# Patient Record
Sex: MALE | Race: WHITE | NOT HISPANIC OR LATINO | Employment: FULL TIME | URBAN - METROPOLITAN AREA
[De-identification: names, ages, dates, MRNs, and addresses within clinical notes are randomized per-mention and may not be internally consistent; named-entity substitution may affect disease eponyms.]

---

## 2018-11-22 ENCOUNTER — APPOINTMENT (EMERGENCY)
Dept: CT IMAGING | Facility: HOSPITAL | Age: 58
End: 2018-11-22
Payer: COMMERCIAL

## 2018-11-22 ENCOUNTER — HOSPITAL ENCOUNTER (EMERGENCY)
Facility: HOSPITAL | Age: 58
Discharge: HOME | End: 2018-11-22
Payer: COMMERCIAL

## 2018-11-22 VITALS
OXYGEN SATURATION: 100 % | WEIGHT: 190 LBS | BODY MASS INDEX: 29.82 KG/M2 | HEART RATE: 66 BPM | HEIGHT: 67 IN | RESPIRATION RATE: 18 BRPM | TEMPERATURE: 97.3 F | DIASTOLIC BLOOD PRESSURE: 73 MMHG | SYSTOLIC BLOOD PRESSURE: 114 MMHG

## 2018-11-22 DIAGNOSIS — N13.2 URETERAL STONE WITH HYDRONEPHROSIS: Primary | ICD-10-CM

## 2018-11-22 LAB
ALBUMIN SERPL BCP-MCNC: 4.5 G/DL (ref 3.5–5.7)
ALP SERPL-CCNC: 59 U/L (ref 55–165)
ALT SERPL W P-5'-P-CCNC: 24 U/L (ref 7–52)
ANION GAP SERPL CALCULATED.3IONS-SCNC: 8 MMOL/L (ref 4–13)
AST SERPL W P-5'-P-CCNC: 29 U/L (ref 13–39)
BACTERIA UR QL AUTO: ABNORMAL /HPF
BILIRUB SERPL-MCNC: 0.8 MG/DL (ref 0.2–1)
BILIRUB UR QL STRIP: NEGATIVE
BUN SERPL-MCNC: 12 MG/DL (ref 7–25)
CALCIUM SERPL-MCNC: 9.4 MG/DL (ref 8.6–10.5)
CHLORIDE SERPL-SCNC: 102 MMOL/L (ref 98–107)
CLARITY UR: CLEAR
CO2 SERPL-SCNC: 27 MMOL/L (ref 21–31)
COLOR UR: YELLOW
CREAT SERPL-MCNC: 1.16 MG/DL (ref 0.7–1.3)
ERYTHROCYTE [DISTWIDTH] IN BLOOD BY AUTOMATED COUNT: 12.2 % (ref 11.5–14.5)
GFR SERPL CREATININE-BSD FRML MDRD: 68 ML/MIN/1.73SQ M
GLUCOSE SERPL-MCNC: 147 MG/DL (ref 65–99)
GLUCOSE UR STRIP-MCNC: NEGATIVE MG/DL
HCT VFR BLD AUTO: 46.5 % (ref 36.5–49.3)
HGB BLD-MCNC: 15.7 G/DL (ref 14–18)
HGB UR QL STRIP.AUTO: ABNORMAL
KETONES UR STRIP-MCNC: ABNORMAL MG/DL
LEUKOCYTE ESTERASE UR QL STRIP: NEGATIVE
LIPASE SERPL-CCNC: 38 U/L (ref 11–82)
LYMPHOCYTES # BLD AUTO: 0.22 THOUSAND/UL (ref 0.6–4.47)
LYMPHOCYTES # BLD AUTO: 3 % (ref 20–51)
MCH RBC QN AUTO: 31.4 PG (ref 26–34)
MCHC RBC AUTO-ENTMCNC: 33.8 G/DL (ref 31–37)
MCV RBC AUTO: 93 FL (ref 81–99)
MONOCYTES # BLD AUTO: 0.36 THOUSAND/UL (ref 0–1.22)
MONOCYTES NFR BLD AUTO: 5 % (ref 4–12)
MUCOUS THREADS UR QL AUTO: ABNORMAL
NEUTS BAND NFR BLD MANUAL: 2 % (ref 0–8)
NEUTS SEG # BLD: 6.62 THOUSAND/UL (ref 1.81–6.82)
NEUTS SEG NFR BLD AUTO: 90 % (ref 42–75)
NITRITE UR QL STRIP: NEGATIVE
NON-SQ EPI CELLS URNS QL MICRO: ABNORMAL /HPF
NRBC BLD AUTO-RTO: 0 /100 WBCS
PH UR STRIP.AUTO: 6 [PH] (ref 5–8)
PLATELET # BLD AUTO: 142 THOUSANDS/UL (ref 149–390)
PMV BLD AUTO: 8.7 FL (ref 8.6–11.7)
POTASSIUM SERPL-SCNC: 4.4 MMOL/L (ref 3.5–5.5)
PROT SERPL-MCNC: 7.6 G/DL (ref 6.4–8.9)
PROT UR STRIP-MCNC: NEGATIVE MG/DL
RBC # BLD AUTO: 5.01 MILLION/UL (ref 4.3–5.9)
RBC #/AREA URNS AUTO: ABNORMAL /HPF
SODIUM SERPL-SCNC: 137 MMOL/L (ref 134–143)
SP GR UR STRIP.AUTO: 1.02 (ref 1–1.03)
TOTAL CELLS COUNTED SPEC: 100
UROBILINOGEN UR QL STRIP.AUTO: 2 E.U./DL
WBC # BLD AUTO: 7.2 THOUSAND/UL (ref 4.8–10.8)
WBC #/AREA URNS AUTO: ABNORMAL /HPF

## 2018-11-22 PROCEDURE — 81003 URINALYSIS AUTO W/O SCOPE: CPT

## 2018-11-22 PROCEDURE — 85027 COMPLETE CBC AUTOMATED: CPT

## 2018-11-22 PROCEDURE — 74176 CT ABD & PELVIS W/O CONTRAST: CPT

## 2018-11-22 PROCEDURE — 96361 HYDRATE IV INFUSION ADD-ON: CPT

## 2018-11-22 PROCEDURE — 36415 COLL VENOUS BLD VENIPUNCTURE: CPT

## 2018-11-22 PROCEDURE — 83690 ASSAY OF LIPASE: CPT

## 2018-11-22 PROCEDURE — 85007 BL SMEAR W/DIFF WBC COUNT: CPT

## 2018-11-22 PROCEDURE — 96374 THER/PROPH/DIAG INJ IV PUSH: CPT

## 2018-11-22 PROCEDURE — 80053 COMPREHEN METABOLIC PANEL: CPT

## 2018-11-22 PROCEDURE — 81001 URINALYSIS AUTO W/SCOPE: CPT

## 2018-11-22 PROCEDURE — 99284 EMERGENCY DEPT VISIT MOD MDM: CPT

## 2018-11-22 RX ORDER — TAMSULOSIN HYDROCHLORIDE 0.4 MG/1
0.4 CAPSULE ORAL
Qty: 10 CAPSULE | Refills: 0 | Status: SHIPPED | OUTPATIENT
Start: 2018-11-22 | End: 2021-09-24

## 2018-11-22 RX ORDER — KETOROLAC TROMETHAMINE 30 MG/ML
30 INJECTION, SOLUTION INTRAMUSCULAR; INTRAVENOUS ONCE
Status: COMPLETED | OUTPATIENT
Start: 2018-11-22 | End: 2018-11-22

## 2018-11-22 RX ORDER — AMLODIPINE BESYLATE 5 MG/1
5 TABLET ORAL DAILY
COMMUNITY

## 2018-11-22 RX ORDER — KETOROLAC TROMETHAMINE 10 MG/1
10 TABLET, FILM COATED ORAL EVERY 6 HOURS PRN
Qty: 12 TABLET | Refills: 0 | Status: SHIPPED | OUTPATIENT
Start: 2018-11-22 | End: 2021-09-24

## 2018-11-22 RX ADMIN — KETOROLAC TROMETHAMINE 30 MG: 30 INJECTION, SOLUTION INTRAMUSCULAR; INTRAVENOUS at 11:49

## 2018-11-22 RX ADMIN — SODIUM CHLORIDE 1000 ML: 0.9 INJECTION, SOLUTION INTRAVENOUS at 11:44

## 2018-11-22 NOTE — ED PROVIDER NOTES
History  Chief Complaint   Patient presents with    Abdominal Pain     RLQ that awoke pt from sleep at 0600 hours today     Michael Pineda is a 75-year-old male who came to the emergency department due to right lower quadrant pain that started 1 day prior to arrival   Patient denies vomiting or diarrhea  He denies constipation  He denies hematuria, dysuria or frequency of urination  Patient further denies having fever chills        History provided by:  Patient   used: No    Abdominal Pain   Pain location:  RLQ  Pain quality: aching    Pain severity:  Moderate  Onset quality:  Gradual  Duration:  1 day  Timing:  Constant  Progression:  Worsening  Chronicity:  New  Context: not alcohol use, not awakening from sleep, not diet changes, not eating, not laxative use, not medication withdrawal, not previous surgeries, not recent illness, not recent sexual activity, not recent travel, not retching, not sick contacts, not suspicious food intake and not trauma    Relieved by:  Nothing  Worsened by:  Nothing  Ineffective treatments:  None tried  Associated symptoms: no anorexia, no belching, no chest pain, no chills, no constipation, no cough, no diarrhea, no dysuria, no fatigue, no fever, no flatus, no hematemesis, no hematochezia, no hematuria, no melena, no nausea, no shortness of breath, no sore throat and no vomiting    Risk factors: no recent hospitalization        Prior to Admission Medications   Prescriptions Last Dose Informant Patient Reported? Taking? amLODIPine (NORVASC) 5 mg tablet 11/21/2018 at Unknown time  Yes Yes   Sig: Take 5 mg by mouth daily      Facility-Administered Medications: None       Past Medical History:   Diagnosis Date    Hypertension        Past Surgical History:   Procedure Laterality Date    GASTRIC BYPASS      LAMINECTOMY      L4-L5    WRIST GANGLION EXCISION Right        History reviewed  No pertinent family history    I have reviewed and agree with the history as documented  Social History   Substance Use Topics    Smoking status: Never Smoker    Smokeless tobacco: Never Used    Alcohol use Yes      Comment: rarely        Review of Systems   Constitutional: Negative for chills, fatigue and fever  HENT: Negative for sore throat  Eyes: Negative  Respiratory: Negative for cough and shortness of breath  Cardiovascular: Negative for chest pain  Gastrointestinal: Positive for abdominal pain  Negative for anorexia, constipation, diarrhea, flatus, hematemesis, hematochezia, melena, nausea and vomiting  Endocrine: Negative  Genitourinary: Negative for dysuria and hematuria  Musculoskeletal: Negative  Skin: Negative  Allergic/Immunologic: Negative  Neurological: Negative  Hematological: Negative  Psychiatric/Behavioral: Negative  Physical Exam  Physical Exam   Constitutional: He is oriented to person, place, and time  He appears well-developed and well-nourished  No distress  HENT:   Head: Normocephalic and atraumatic  Right Ear: External ear normal    Left Ear: External ear normal    Nose: Nose normal    Mouth/Throat: Oropharynx is clear and moist  No oropharyngeal exudate  Eyes: Pupils are equal, round, and reactive to light  Conjunctivae and EOM are normal  Right eye exhibits no discharge  Left eye exhibits no discharge  No scleral icterus  Neck: Normal range of motion  Neck supple  No tracheal deviation present  No thyromegaly present  Cardiovascular: Normal rate, regular rhythm, normal heart sounds and intact distal pulses  Pulmonary/Chest: Effort normal and breath sounds normal  No respiratory distress  Abdominal: Soft  Bowel sounds are normal  There is tenderness in the right lower quadrant  Musculoskeletal: Normal range of motion  He exhibits no edema, tenderness or deformity  Lymphadenopathy:     He has no cervical adenopathy  Neurological: He is alert and oriented to person, place, and time   No cranial nerve deficit or sensory deficit  He exhibits normal muscle tone  Coordination normal    Skin: Skin is warm and dry  No rash noted  He is not diaphoretic  No erythema  No pallor  Psychiatric: He has a normal mood and affect  His behavior is normal  Judgment and thought content normal    Nursing note and vitals reviewed  Vital Signs  ED Triage Vitals [11/22/18 1109]   Temperature Pulse Respirations Blood Pressure SpO2   (!) 97 3 °F (36 3 °C) 56 16 (!) 179/89 99 %      Temp Source Heart Rate Source Patient Position - Orthostatic VS BP Location FiO2 (%)   Temporal Monitor Sitting Left arm --      Pain Score       4           Vitals:    11/22/18 1109 11/22/18 1313   BP: (!) 179/89 114/73   Pulse: 56 61   Patient Position - Orthostatic VS: Sitting Lying       Visual Acuity      ED Medications  Medications   sodium chloride 0 9 % bolus 1,000 mL (1,000 mL Intravenous New Bag 11/22/18 1144)   ketorolac (TORADOL) injection 30 mg (30 mg Intravenous Given 11/22/18 1149)       Diagnostic Studies  Results Reviewed     Procedure Component Value Units Date/Time    UA w Reflex to Microscopic [[id number]]  (Abnormal) Collected:  11/22/18 1312    Lab Status:  Final result Specimen:  Urine from Urine, Clean Catch Updated:  11/22/18 1324     Color, UA Yellow     Clarity, UA Clear     Specific Gravity, UA 1 025     pH, UA 6 0     Leukocytes, UA Negative     Nitrite, UA Negative     Protein, UA Negative mg/dl      Glucose, UA Negative mg/dl      Ketones, UA 40 (2+) (A) mg/dl      Urobilinogen, UA 2 0 (A) E U /dl      Bilirubin, UA Negative     Blood, UA 3+ (A)    Urine Microscopic [[id number]] Collected:  11/22/18 1312    Lab Status:   In process Specimen:  Urine from Urine, Clean Catch Updated:  11/22/18 1323    Lipase [[id number]]  (Normal) Collected:  11/22/18 1132    Lab Status:  Final result Specimen:  Blood from Arm, Left Updated:  11/22/18 1205     Lipase 38 u/L     Comprehensive metabolic panel [[id number]]  (Abnormal) Collected:  11/22/18 1132    Lab Status:  Final result Specimen:  Blood from Arm, Left Updated:  11/22/18 1205     Sodium 137 mmol/L      Potassium 4 4 mmol/L      Chloride 102 mmol/L      CO2 27 mmol/L      ANION GAP 8 mmol/L      BUN 12 mg/dL      Creatinine 1 16 mg/dL      Glucose 147 (H) mg/dL      Calcium 9 4 mg/dL      AST 29 U/L      ALT 24 U/L      Alkaline Phosphatase 59 U/L      Total Protein 7 6 g/dL      Albumin 4 5 g/dL      Total Bilirubin 0 80 mg/dL      eGFR 68 ml/min/1 73sq m     Narrative:         National Kid[employer] Disease Education Program recommendations are as follows:  GFR calculation is accurate only with a steady state creatinine  Chronic Kidney disease less than 60 ml/min/1 73 sq  meters  Kidney failure less than 15 ml/min/1 73 sq  meters  CBC and differential [[id number]]  (Abnormal) Collected:  11/22/18 1132    Lab Status:  Final result Specimen:  Blood from Arm, Left Updated:  11/22/18 1148     WBC 7 20 Thousand/uL      RBC 5 01 Million/uL      Hemoglobin 15 7 g/dL      Hematocrit 46 5 %      MCV 93 fL      MCH 31 4 pg      MCHC 33 8 g/dL      RDW 12 2 %      MPV 8 7 fL      Platelets 374 (L) Thousands/uL      nRBC 0 /100 WBCs                  CT abdomen pelvis wo contrast   Final Result by Rubio Guadalupe (11/22 1245)   2 mm obstructing proximal right ureteral calculus with mild associated   hydronephrosis and perinephric stranding  Signed by Rubio Guadalupe MD                 Procedures  Procedures       Phone Contacts  ED Phone Contact    ED Course  ED Course as of Nov 22 1329   Thu Nov 22, 2018   1252 Patient is resting comfortably on the stretcher  He is awake and alert and oriented to time, place and person  I discussed with him the results of his CT scan of the abdomen pelvis as well as blood work  Urinalysis is pending at this time                                  MDM  Number of Diagnoses or Management Options  Ureteral stone with hydronephrosis: new and requires workup Amount and/or Complexity of Data Reviewed  Clinical lab tests: ordered and reviewed  Tests in the radiology section of CPT®: ordered and reviewed  Tests in the medicine section of CPT®: ordered and reviewed  Decide to obtain previous medical records or to obtain history from someone other than the patient: yes  Review and summarize past medical records: yes    Risk of Complications, Morbidity, and/or Mortality  Presenting problems: low  Diagnostic procedures: low  Management options: low    Patient Progress  Patient progress: improved    CritCare Time    Disposition  Final diagnoses:   Ureteral stone with hydronephrosis     Time reflects when diagnosis was documented in both MDM as applicable and the Disposition within this note     Time User Action Codes Description Comment    11/22/2018  1:27 PM Nikki Talley Add [N13 2] Ureteral stone with hydronephrosis       ED Disposition     ED Disposition Condition Comment    Discharge  Walton Butter discharge to home/self care  Condition at discharge: Good        Follow-up Information     Follow up With Specialties Details Why Korin Arroyo MD Urology Schedule an appointment as soon as possible for a visit  [address] [place] [address]            Patient's Medications   Discharge Prescriptions    KETOROLAC (TORADOL) 10 MG TABLET    Take 1 tablet (10 mg total) by mouth every 6 (six) hours as needed for moderate pain for up to 12 doses       Start Date: 11/22/2018End Date: --       Order Dose: 10 mg       Quantity: 12 tablet    Refills: 0    TAMSULOSIN (FLOMAX) 0 4 MG    Take 1 capsule (0 4 mg total) by mouth daily with dinner for 10 days       Start Date: 11/22/2018End Date: 12/2/2018       Order Dose: 0 4 mg       Quantity: 10 capsule    Refills: 0     No discharge procedures on file      ED Provider  Electronically Signed by           Rubi Craft MD  11/22/18 2080

## 2018-11-22 NOTE — DISCHARGE INSTRUCTIONS
Hydronephrosis   WHAT YOU NEED TO KNOW:   What is hydronephrosis? Hydronephrosis is swelling in one or both kidneys caused by urine buildup  Urine normally flows from the kidneys to the bladder through tubes called ureters  A blockage in the ureters can prevent urine from flowing properly  Urine flow may also be prevented or slowed if your kidneys do not work correctly  Urine flows back into your urinary tract  Pressure builds up in the kidney and causes swelling  What increases my risk for hydronephrosis? · Nerve damage or narrowed blood vessels    · Kidney stones, blood clots, or tumors that cause a blockage    · Urinary tract infections    · Body changes during pregnancy    · Enlarged prostate  What are the signs and symptoms of hydronephrosis? You may have no signs or symptoms, or you may have any of the following:  · Frequent urinary tract infections    · Mild or severe lower back pain that may spread to the groin    · Urinating little or not at all, even with an urge to urinate    · Dribbling urine, or loss of urine control    · Blood or pus in your urine    · Nausea, vomiting, fever, or chills    · Abdominal fullness or swelling    · Weight gain that you cannot explain  How is hydronephrosis diagnosed? Your healthcare provider will examine you and ask you about your signs and symptoms  He may also feel your abdomen or pelvis for any pain or swelling  You may also need any of the following:  · Blood tests  show if your kidneys are working properly or have a blockage  · Kidney function tests  show how well your kidneys are working  · X-rays  may be taken of your kidneys, bladder, and ureters  You may need to have dye injected into your kidneys before the x-rays to help healthcare providers find the blockage  Tell the healthcare provider if you have ever had an allergic reaction to contrast dye  · Urine tests  show how much urine your body is removing   They may also show if you have infection, blood, or protein in your urine  This may mean your kidneys are not working as they should  · A CT scan  (CAT scan) uses an x-ray and a computer to take pictures of your kidneys, bladder, and ureters  The pictures may show a kidney stone or other obstruction  · An ultrasound  may be used to show your kidney or bladder size, and if either is swollen  Ultrasound can also be used to find kidney stones  You may need to have a CT and an ultrasound together to find a blockage  How is hydronephrosis treated? Treatment may help keep your kidneys healthy, and prevent infection  You may need the following:  · A renal diet  is a meal plan that includes foods that are low in sodium (salt), potassium, and protein  Your healthcare provider may also tell you to eat and drink more vegetables and juices  · Stone removal  may be used to remove the kidney stones that are slowing or blocking your urine flow  Your healthcare provider may use strong sound waves called shock wave therapy to break up large kidney stones  This will help make them small enough for you to pass them when you urinate  · Catheter or stent placement  may be needed to help increase your urine flow  You may need a catheter (flexible tube) placed directly into your bladder to drain urine  Your healthcare provider may place a hard plastic tube called a stent inside your urinary tract to help urine pass from your kidney to your bladder  · Surgery  may be needed to remove part or all of your kidney if it is not working properly  Your prostate may need to be removed if it is so large that it is blocking urine flow  What are the risks of hydronephrosis? Swelling in one or both kidneys from too much urine buildup may lead to long-term kidney damage  Partial blockages may cause loss of urine control  Severe hydronephrosis may cause a blood infection called sepsis  Sepsis is toxin (poison) buildup in your blood   It happens when your kidneys cannot flush toxins out of your body  It could also paralyze your intestines  Your kidneys could fail without treatment  These conditions may be life-threatening  When should I contact my healthcare provider? · Your abdomen feels full  · You have a change in how much or how often you urinate  · You urinate more times at night and in larger amounts than during the day  · You have mild lower back pain or pain on one side when you urinate  When should I seek immediate care? · You have severe, stabbing back pain  · You have blood in your urine  · You cannot urinate, or you urinate very little  CARE AGREEMENT:   You have the right to help plan your care  Learn about your health condition and how it may be treated  Discuss treatment options with your caregivers to decide what care you want to receive  You always have the right to refuse treatment  The above information is an  only  It is not intended as medical advice for individual conditions or treatments  Talk to your doctor, nurse or pharmacist before following any medical regimen to see if it is safe and effective for you  © 2017 [facility] [facility] Information is for End User's use only and may not be sold, redistributed or otherwise used for commercial purposes  All illustrations and images included in CareNotes® are the copyrighted property of [employer]  or [employer]  Hydronephrosis   WHAT YOU NEED TO KNOW:   Hydronephrosis is swelling in one or both kidneys caused by urine buildup  Urine normally flows from the kidneys to the bladder through tubes called ureters  A blockage in the ureters can prevent urine from flowing properly  Urine flow may also be prevented or slowed if your kidneys do not work correctly  Urine flows back into your urinary tract  Pressure builds up in the kidney and causes swelling          DISCHARGE INSTRUCTIONS:   Follow up with your healthcare provider or specialist as directed: You may need to be referred to a gynecologist, oncologist, or urologist for more tests and treatment  Write down your questions so you remember to ask them during your visits  Contact your healthcare provider if:   · Your abdomen feels full  · You have a change in how much or how often you urinate  · You urinate more times at night and in larger amounts than during the day  · You have mild lower back pain or pain on one side when you urinate  · You have questions or concerns about your condition or care  Return to the emergency department if:   · You have severe, stabbing back pain  · You have blood in your urine  · You cannot urinate, or you urinate very little  © 2017 [address] [employer]  Information is for End User's use only and may not be sold, redistributed or otherwise used for commercial purposes  All illustrations and images included in CareNotes® are the copyrighted property of [employer]  or [employer]  The above information is an  only  It is not intended as medical advice for individual conditions or treatments  Talk to your doctor, nurse or pharmacist before following any medical regimen to see if it is safe and effective for you  Ureteral Stones   WHAT YOU NEED TO KNOW:   A ureteral stone is a stone that forms in the kidney and moves down the ureter and gets stuck there  The ureter is the tube that takes urine from the kidney to the bladder  Stones can form in the urinary system when your urine has high levels of minerals and salts  Urinary stones can be made of uric acid, calcium, phosphate, or oxalate crystals  DISCHARGE INSTRUCTIONS:   Return to the emergency department if:   · You have severe pain that does not improve, even after you take medicine  · You have vomiting that is not relieved by medicine  · You develop a fever  Contact your healthcare provider if:   · You develop a fever       · You have any questions or concerns about your condition or care  Follow up with your healthcare provider as directed: You may need to return for more tests  Write down your questions so you remember to ask them during your visits  Medicines: You may need any of the following:  · NSAIDs , such as ibuprofen, help decrease swelling, pain, and fever  This medicine is available with or without a doctor's order  NSAIDs can cause stomach bleeding or kidney problems in certain people  If you take blood thinner medicine, always ask your healthcare provider if NSAIDs are safe for you  Always read the medicine label and follow directions  · Prescription pain medicine  may help decrease pain or help your ureteral stone pass  Do not wait until the pain is severe before you take pain medicine  · Nausea medicine  may help calm your stomach and prevent vomiting  · Take your medicine as directed  Contact your healthcare provider if you think your medicine is not helping or if you have side effects  Tell him or her if you are allergic to any medicine  Keep a list of the medicines, vitamins, and herbs you take  Include the amounts, and when and why you take them  Bring the list or the pill bottles to follow-up visits  Carry your medicine list with you in case of an emergency  Self-care:   · Drink plenty of liquids  Your healthcare provider may tell you to drink at least 8 to 12 (eight-ounce) cups of liquids each day  This helps flush out the ureteral stones when you urinate  Water is the best liquid to drink  · Strain your urine every time you go to the bathroom  Urinate through a strainer or a piece of thin cloth to catch the stones  Take the stones to your healthcare provider so they can be sent to the lab for tests  This will help your healthcare providers plan the best treatment for you  · Ask your healthcare provider about any nutrition changes you need to make    You may need to limit certain foods such as foods high in sodium (salt), certain protein foods, or foods high in oxalate  After you pass your ureteral stone: Once you have passed your ureteral stone, you may need to do a 24-hour urine test  You may need to save all of your urine for 24 hours  Each time you go to the bathroom, you will urinate into a container  Then you will pour your urine into a larger container that is kept cold  You may be told to write down the time and amount of urine you passed  At the end of 24 hours, the urine is sent to a lab for tests  Results from the test will help your healthcare provider plan ways to prevent more stones from forming  © 2017 [address] [employer]  Information is for End User's use only and may not be sold, redistributed or otherwise used for commercial purposes  All illustrations and images included in CareNotes® are the copyrighted property of [employer]  or Michael Griffin  The above information is an  only  It is not intended as medical advice for individual conditions or treatments  Talk to your doctor, nurse or pharmacist before following any medical regimen to see if it is safe and effective for you  Ureteral Stones   WHAT YOU NEED TO KNOW:   What is a ureteral stone? A ureteral stone is a stone that forms in the kidney and moves down the ureter and gets stuck there  The ureter is the tube that takes urine from the kidney to the bladder  Stones can form in the urinary system when your urine has high levels of minerals and salts  Urinary stones can be made of uric acid, calcium, phosphate, or oxalate crystals  What increases my risk for urinary stones? · You do not drink enough liquids (especially water) each day  · You follow a certain type of diet  For example, people who eat a diet high in meat or salt may be at higher risk for urinary stones  People who eat foods high in oxalate may also be at higher risk   Foods that are high in oxalate include nuts, chocolate, coffee, and green leafy vegetables  · You take certain medicines such as diuretics or calcium or vitamin C supplements  · You have a family member who has had urinary stones  · You have conditions such as obesity, a kidney or bowel disorder, or gout  What are the signs and symptoms of ureteral stones? · Severe pain on your lower abdomen or groin    · Nausea and vomiting    · Urge to urinate often, burning feeling when you urinate, or pink or red urine  How are ureteral stones diagnosed? · Urine tests  may show if you have blood in your urine  They may also show high amounts of the substances that form stones, such as uric acid  · Blood tests  show how well your kidneys are working  They may also be used to check the levels of calcium or uric acid in your blood  · An x-ray or CT scan  of your kidneys, ureters, and bladder may be done  You may be given a dye before the pictures are taken to help healthcare providers see the pictures better  You may need to have more than one x-ray  Tell the healthcare provider if you have ever had an allergic reaction to contrast dye  How are ureteral stones treated? · NSAIDs , such as ibuprofen, help decrease swelling, pain, and fever  This medicine is available with or without a doctor's order  NSAIDs can cause stomach bleeding or kidney problems in certain people  If you take blood thinner medicine, always ask your healthcare provider if NSAIDs are safe for you  Always read the medicine label and follow directions  · Prescription pain medicine  may help decrease pain or help your ureteral stone pass  Do not wait until the pain is severe before you take pain medicine  · Nausea medicine  may help calm your stomach and prevent vomiting  · A procedure or surgery  to remove the ureteral stone may be needed if it does not pass on its own  How can I care for myself at home? · Drink plenty of liquids    Your healthcare provider may tell you to drink at least 8 to 12 (eight-ounce) cups of liquids each day  This helps flush out the stones when you urinate  Water is the best liquid to drink  · Strain your urine every time you go to the bathroom  Urinate through a strainer or a piece of thin cloth to catch the stone  Take the stone to your healthcare provider so it can be sent to the lab for tests  This will help your healthcare providers plan the best treatment for you  · Ask your healthcare provider about any nutrition changes you need to make  You may need to limit certain foods such as foods high in sodium (salt), certain protein foods, or foods high in oxalate  When should I seek immediate care? · You have severe pain that does not improve, even after you take medicine  · You have vomiting that is not relieved by medicine  When should I contact my healthcare provider? · You develop a fever  · You have any questions or concerns about your condition or care  CARE AGREEMENT:   You have the right to help plan your care  Learn about your health condition and how it may be treated  Discuss treatment options with your caregivers to decide what care you want to receive  You always have the right to refuse treatment  The above information is an  only  It is not intended as medical advice for individual conditions or treatments  Talk to your doctor, nurse or pharmacist before following any medical regimen to see if it is safe and effective for you  © 2017 [address] [employer] Information is for End User's use only and may not be sold, redistributed or otherwise used for commercial purposes  All illustrations and images included in CareNotes® are the copyrighted property of [employer]  or [employer] Griffin

## 2019-10-16 ENCOUNTER — EVALUATION (OUTPATIENT)
Dept: PHYSICAL THERAPY | Age: 59
End: 2019-10-16
Payer: COMMERCIAL

## 2019-10-16 ENCOUNTER — TRANSCRIBE ORDERS (OUTPATIENT)
Dept: PHYSICAL THERAPY | Age: 59
End: 2019-10-16

## 2019-10-16 DIAGNOSIS — M25.511 RIGHT SHOULDER PAIN, UNSPECIFIED CHRONICITY: Primary | ICD-10-CM

## 2019-10-16 PROCEDURE — 97014 ELECTRIC STIMULATION THERAPY: CPT | Performed by: PHYSICAL THERAPIST

## 2019-10-16 PROCEDURE — 97110 THERAPEUTIC EXERCISES: CPT | Performed by: PHYSICAL THERAPIST

## 2019-10-16 PROCEDURE — G8991 OTHER PT/OT GOAL STATUS: HCPCS | Performed by: PHYSICAL THERAPIST

## 2019-10-16 PROCEDURE — 97161 PT EVAL LOW COMPLEX 20 MIN: CPT | Performed by: PHYSICAL THERAPIST

## 2019-10-16 PROCEDURE — 97140 MANUAL THERAPY 1/> REGIONS: CPT | Performed by: PHYSICAL THERAPIST

## 2019-10-16 PROCEDURE — G8990 OTHER PT/OT CURRENT STATUS: HCPCS | Performed by: PHYSICAL THERAPIST

## 2019-10-16 PROCEDURE — G0283 ELEC STIM OTHER THAN WOUND: HCPCS | Performed by: PHYSICAL THERAPIST

## 2019-10-16 NOTE — LETTER
2019    David Tate [address]    Patient: Emy Haddad   YOB: 1960   Date of Visit: 10/16/2019     Encounter Diagnosis     ICD-10-CM    1  Right shoulder pain, unspecified chronicity M25 511        Dear Dr Ying Ly: Thank you for your recent referral of Emy Haddad  Please review the attached evaluation summary from Adam's recent visit  Please verify that you agree with the plan of care by signing the attached order  If you have any questions or concerns, please do not hesitate to call  I sincerely appreciate the opportunity to share in the care of one of your patients and hope to have another opportunity to work with you in the near future  Sincerely,    Malena Hankins, PT      Referring Provider:      I certify that I have read the below Plan of Care and certify the need for these services furnished under this plan of treatment while under my care  Glenys BergeronDO  [address] [facility] [address]  VIA Facsimile: [phone]          PT Evaluation     Today's date: 10/16/2019  Patient name: Emy Haddad  :   MRN: [id number]  Referring provider: Lola Melendez DO  Dx:   Encounter Diagnosis     ICD-10-CM    1  Right shoulder pain, unspecified chronicity M25 511        Start Time: 845  Stop Time: 945  Total time in clinic (min): 60 minutes    Assessment  Assessment details: Emy Haddad is a 62 y o  male who presents with pain, decreased strength, decreased ROM and decreased joint mobility  Due to these impairments, Patient has difficulty performing a/iadls  Patient's clinical presentation is consistent with their referring diagnosis of right shoulder  Patient would benefit from skilled physical therapy to address their aforementioned impairments, improve their level of function and to improve their overall quality of life    Impairments: abnormal muscle tone, abnormal or restricted ROM, abnormal movement, activity intolerance, impaired physical strength, lacks appropriate home exercise program, pain with function, scapular dyskinesis, poor posture  and poor body mechanics  Understanding of Dx/Px/POC: good   Prognosis: good    Goals  ST-3 WEEKS  1  Decrease pain by 2 points on VAS at its worst   2   Increase ROM by > 5 deg in all deficients planes  3   Increase UE by 1/2 MMT grade in all deficient planes  LT-6 WEEKS  1  Patient to be independent with a/iadls especially reaching  2  Increase functional activities for leisure and home activities to previous LOF    3  Independent with HEP and/or fitness program     Plan  Patient would benefit from: skilled physical therapy  Planned modality interventions: cryotherapy, electrical stimulation/Russian stimulation, thermotherapy: hydrocollator packs and unattended electrical stimulation  Planned therapy interventions: activity modification, behavior modification, body mechanics training, aquatic therapy, flexibility, functional ROM exercises, home exercise program, IADL retraining, joint mobilization, manual therapy, neuromuscular re-education, patient education, postural training, strengthening, stretching, therapeutic activities and therapeutic exercise  Frequency: 2x week (2-3x week)  Duration in weeks: 12  Plan of Care beginning date: 10/16/2019  Plan of Care expiration date: 2020  Treatment plan discussed with: patient        Subjective Evaluation    History of Present Illness  Date of onset: 3/4/2019  Mechanism of injury: Slipped and fell on right  Shoulder, now complains of right shoulder pain , cortisone shot helped  Quality of life: good    Pain  Current pain rating: 3  At best pain ratin  At worst pain ratin  Quality: dull ache  Relieving factors: heat and rest  Aggravating factors: overhead activity  Progression: no change    Hand dominance: right      Diagnostic Tests  X-ray: normal  Treatments  Previous treatment: injection treatment  Patient Goals  Patient goals for therapy: decreased pain, increased motion, increased strength and independence with ADLs/IADLs          Objective     Static Posture     Shoulders  Depressed  Observations     Right Shoulder  Positive for atrophy  Palpation     Right   No palpable tenderness to the supraspinatus  Tenderness     Right Shoulder  Tenderness in the [facility] joint  Active Range of Motion   Left Shoulder   Flexion: 165 degrees   External rotation 90°:  WFL  Internal rotation 90°:  60 degrees     Right Shoulder   Flexion: 160 degrees   External rotation 90°:  90 degrees  Internal rotation 90°:  45 degrees     Strength/Myotome Testing     Right Shoulder     Planes of Motion   Flexion: 4   Extension: 4   Abduction: 4-   External rotation at 0°:  4-     Isolated Muscles   Supraspinatus: 3+     Tests     Right Shoulder   Positive anterior slide, Hawkin's and anterior slide         Flowsheet Rows      Most Recent Value   PT/OT G-Codes   Current Score  50   Projected Score  69   Assessment Type  Evaluation   G code set  Other PT/OT Primary   Other PT Primary Current Status ()  CJ   Other PT Primary Goal Status ()  CI             Precautions: HTN, Laminectomy,gastric bypass      Manual  10/16                         MT right shoulder 15                                                       Exercise Diary  10/16            UBE 4 min            ROW DELT 30/30            TB RTC [address]                                                                                                                                                                                                                                             Modalities  10/16             10            estim 10

## 2019-10-16 NOTE — PROGRESS NOTES
PT Evaluation     Today's date: 10/16/2019  Patient name: Lisa Whittington  : [id number]  MRN: [id number]  Referring provider: Rober Masterson DO  Dx:   Encounter Diagnosis     ICD-10-CM    1  Right shoulder pain, unspecified chronicity M25 511        Start Time: 845  Stop Time: 945  Total time in clinic (min): 60 minutes    Assessment  Assessment details: Lisa Whittington is a 62 y o  male who presents with pain, decreased strength, decreased ROM and decreased joint mobility  Due to these impairments, Patient has difficulty performing a/iadls  Patient's clinical presentation is consistent with their referring diagnosis of right shoulder  Patient would benefit from skilled physical therapy to address their aforementioned impairments, improve their level of function and to improve their overall quality of life  Impairments: abnormal muscle tone, abnormal or restricted ROM, abnormal movement, activity intolerance, impaired physical strength, lacks appropriate home exercise program, pain with function, scapular dyskinesis, poor posture  and poor body mechanics  Understanding of Dx/Px/POC: good   Prognosis: good    Goals  ST-3 WEEKS  1  Decrease pain by 2 points on VAS at its worst   2   Increase ROM by > 5 deg in all deficients planes  3   Increase UE by 1/2 MMT grade in all deficient planes  LT-6 WEEKS  1  Patient to be independent with a/iadls especially reaching  2  Increase functional activities for leisure and home activities to previous LOF    3  Independent with HEP and/or fitness program     Plan  Patient would benefit from: skilled physical therapy  Planned modality interventions: cryotherapy, electrical stimulation/Russian stimulation, thermotherapy: hydrocollator packs and unattended electrical stimulation  Planned therapy interventions: activity modification, behavior modification, body mechanics training, aquatic therapy, flexibility, functional ROM exercises, home exercise program, IADL retraining, joint mobilization, manual therapy, neuromuscular re-education, patient education, postural training, strengthening, stretching, therapeutic activities and therapeutic exercise  Frequency: 2x week (2-3x week)  Duration in weeks: 12  Plan of Care beginning date: 10/16/2019  Plan of Care expiration date: 2020  Treatment plan discussed with: patient        Subjective Evaluation    History of Present Illness  Date of onset: 3/4/2019  Mechanism of injury: Slipped and fell on right  Shoulder, now complains of right shoulder pain , cortisone shot helped  Quality of life: good    Pain  Current pain rating: 3  At best pain ratin  At worst pain ratin  Quality: dull ache  Relieving factors: heat and rest  Aggravating factors: overhead activity  Progression: no change    Hand dominance: right      Diagnostic Tests  X-ray: normal  Treatments  Previous treatment: injection treatment  Patient Goals  Patient goals for therapy: decreased pain, increased motion, increased strength and independence with ADLs/IADLs          Objective     Static Posture     Shoulders  Depressed  Observations     Right Shoulder  Positive for atrophy  Palpation     Right   No palpable tenderness to the supraspinatus  Tenderness     Right Shoulder  Tenderness in the [facility] joint  Active Range of Motion   Left Shoulder   Flexion: 165 degrees   External rotation 90°: WFL  Internal rotation 90°: 60 degrees     Right Shoulder   Flexion: 160 degrees   External rotation 90°: 90 degrees  Internal rotation 90°: 45 degrees     Strength/Myotome Testing     Right Shoulder     Planes of Motion   Flexion: 4   Extension: 4   Abduction: 4-   External rotation at 0°: 4-     Isolated Muscles   Supraspinatus: 3+     Tests     Right Shoulder   Positive anterior slide, Hawkin's and anterior slide         Flowsheet Rows      Most Recent Value   PT/OT G-Codes   Current Score  50   Projected Score  69   Assessment Type  Evaluation   G code set Other PT/OT Primary   Other PT Primary Current Status ()  CJ   Other PT Primary Goal Status ()  CI             Precautions: HTN, Laminectomy,gastric bypass      Manual  10/16                         MT right shoulder 15                                                       Exercise Diary  10/16            UBE 4 min            ROW DELT 30/30            TB RTC [address]                                                                                                                                                                                                                                             Modalities  10/16            MH 10            estim 10

## 2019-10-21 ENCOUNTER — APPOINTMENT (OUTPATIENT)
Dept: PHYSICAL THERAPY | Age: 59
End: 2019-10-21
Payer: COMMERCIAL

## 2019-10-23 ENCOUNTER — APPOINTMENT (OUTPATIENT)
Dept: PHYSICAL THERAPY | Age: 59
End: 2019-10-23
Payer: COMMERCIAL

## 2019-10-23 NOTE — PROGRESS NOTES
Daily Note     Today's date: 10/24/2019  Patient name: Chai Perez  : [id number]  MRN: [id number]  Referring provider: Rod Aguirre DO  Dx:   Encounter Diagnosis     ICD-10-CM    1  Right shoulder pain, unspecified chronicity M25 511        Start Time: 0900  Stop Time: 09  Total time in clinic (min): 45 minutes    Subjective: Patient reports no pain this morning  Objective: See treatment diary below      Assessment: Tolerated treatment well, increased tightness at end ranges FF and ER, no real c/o pain during exercises, notes some fatigue end of the row exercise  Patient demonstrated fatigue post treatment and would benefit from continued PT      Plan: Continue per plan of care        Precautions: HTN, Laminectomy,gastric bypass      Manual  10/16 10/24                        MT right shoulder 15 15                                                      Exercise Diary  10/16 10/24           Can FF/CP  30x ea           UBE 4 min 4'           ROW DELT 30/30 30/30           TB RTC [address] ea way 15x ea                                                                                Modalities  10/16 10/24           MH 10 10           estim 10 10

## 2019-10-24 ENCOUNTER — OFFICE VISIT (OUTPATIENT)
Dept: PHYSICAL THERAPY | Age: 59
End: 2019-10-24
Payer: COMMERCIAL

## 2019-10-24 DIAGNOSIS — M25.511 RIGHT SHOULDER PAIN, UNSPECIFIED CHRONICITY: Primary | ICD-10-CM

## 2019-10-24 PROCEDURE — 97140 MANUAL THERAPY 1/> REGIONS: CPT

## 2019-10-24 PROCEDURE — 97014 ELECTRIC STIMULATION THERAPY: CPT

## 2019-10-24 PROCEDURE — G0283 ELEC STIM OTHER THAN WOUND: HCPCS

## 2019-10-24 PROCEDURE — 97110 THERAPEUTIC EXERCISES: CPT

## 2019-10-25 ENCOUNTER — APPOINTMENT (OUTPATIENT)
Dept: PHYSICAL THERAPY | Age: 59
End: 2019-10-25
Payer: COMMERCIAL

## 2019-10-28 ENCOUNTER — APPOINTMENT (OUTPATIENT)
Dept: PHYSICAL THERAPY | Age: 59
End: 2019-10-28
Payer: COMMERCIAL

## 2019-10-30 ENCOUNTER — APPOINTMENT (OUTPATIENT)
Dept: PHYSICAL THERAPY | Age: 59
End: 2019-10-30
Payer: COMMERCIAL

## 2019-11-01 ENCOUNTER — OFFICE VISIT (OUTPATIENT)
Dept: PHYSICAL THERAPY | Age: 59
End: 2019-11-01
Payer: COMMERCIAL

## 2019-11-01 DIAGNOSIS — M25.511 RIGHT SHOULDER PAIN, UNSPECIFIED CHRONICITY: Primary | ICD-10-CM

## 2019-11-01 PROCEDURE — 97014 ELECTRIC STIMULATION THERAPY: CPT | Performed by: PHYSICAL THERAPIST

## 2019-11-01 PROCEDURE — 97140 MANUAL THERAPY 1/> REGIONS: CPT | Performed by: PHYSICAL THERAPIST

## 2019-11-01 PROCEDURE — 97110 THERAPEUTIC EXERCISES: CPT | Performed by: PHYSICAL THERAPIST

## 2019-11-01 PROCEDURE — G0283 ELEC STIM OTHER THAN WOUND: HCPCS | Performed by: PHYSICAL THERAPIST

## 2019-11-01 NOTE — PROGRESS NOTES
Daily Note     Today's date: 2019  Patient name: Perez Pichardo  :   MRN: [id number]  Referring provider: Anita Elizabeth DO  Dx:   Encounter Diagnosis     ICD-10-CM    1  Right shoulder pain, unspecified chronicity M25 511        Start Time: 1445  Stop Time: 1545  Total time in clinic (min): 60 minutes    Subjective: Patient reports he is getting better but can't throw a ball  Objective: See treatment diary below      Assessment: Tolerated treatment well  Patient would benefit from continued PT Patient has good ROM right shoulder, c/o pain with certain motions right shoulder  Added wall push ups through limited ROM for HEP and ice  Plan: Continue per plan of care        Precautions: HTN, Laminectomy,gastric bypass  Modalities  10/16 10/24 11/1          MH/ES 10 10 10                       Ice after   10'              Manual  10/16 10/24 11/1                       MT right shoulder 15 15 10'                                                     Exercise Diary  10/16 10/24 11/1          Cane FF  30x ea 30x           Cane CP   2# 30x          shld stab @ 90°   nv 2#                      UBE 4 min 4' 4'          ROW DELT 30/30 30/30 30# 30x          TB RTC [address] ea way 15x ea 30x ea          PNF D2 ext on FT   30x 10#

## 2019-11-04 ENCOUNTER — OFFICE VISIT (OUTPATIENT)
Dept: PHYSICAL THERAPY | Age: 59
End: 2019-11-04
Payer: COMMERCIAL

## 2019-11-04 DIAGNOSIS — M25.511 RIGHT SHOULDER PAIN, UNSPECIFIED CHRONICITY: Primary | ICD-10-CM

## 2019-11-04 PROCEDURE — G0283 ELEC STIM OTHER THAN WOUND: HCPCS | Performed by: PHYSICAL THERAPIST

## 2019-11-04 PROCEDURE — 97140 MANUAL THERAPY 1/> REGIONS: CPT | Performed by: PHYSICAL THERAPIST

## 2019-11-04 PROCEDURE — 97014 ELECTRIC STIMULATION THERAPY: CPT | Performed by: PHYSICAL THERAPIST

## 2019-11-04 PROCEDURE — 97110 THERAPEUTIC EXERCISES: CPT | Performed by: PHYSICAL THERAPIST

## 2019-11-04 NOTE — PROGRESS NOTES
Daily Note     Today's date: 2019  Patient name: Amina Maloney  :   MRN: [id number]  Referring provider: Cornelia Shane DO  Dx:   Encounter Diagnosis     ICD-10-CM    1  Right shoulder pain, unspecified chronicity M25 511        Start Time: 730  Stop Time: 0820  Total time in clinic (min): 50 minutes    Subjective: some improvement      Objective: See treatment diary below      Assessment: Tolerated treatment well  Patient demonstrated fatigue post treatment, exhibited good technique with therapeutic exercises and would benefit from continued PT Patient demonstrates a tight posterior capsule with an increased anterior glide,but his strength is slowly progressing        Plan: Progress treatment as tolerated         Precautions: HTN, Laminectomy,gastric bypass  Modalities  10/16 10/24 11/1 11/4         MH/ES 10 10 10 10                      Ice after   10' 10             Manual  10/16 10/24 11/1 11                      MT right shoulder 15 15 10' 10                                                    Exercise Diary  10/16 10/24 11/1 11/4         Cane FF  30x ea 30x  30x         Cane CP   2# 30x 2/30         shld stab @ 90°   nv 2#                      UBE 4 min 4' 4' 4'         ROW DELT 30/30 30/30 30# 30x 30/30         TB RTC 10 ea way 15x ea 30x ea 30x         PNF D2 ext on FT   30x 10# 10/30

## 2019-11-06 ENCOUNTER — OFFICE VISIT (OUTPATIENT)
Dept: PHYSICAL THERAPY | Age: 59
End: 2019-11-06
Payer: COMMERCIAL

## 2019-11-06 DIAGNOSIS — M25.511 RIGHT SHOULDER PAIN, UNSPECIFIED CHRONICITY: Primary | ICD-10-CM

## 2019-11-06 PROCEDURE — G0283 ELEC STIM OTHER THAN WOUND: HCPCS | Performed by: PHYSICAL THERAPIST

## 2019-11-06 PROCEDURE — 97140 MANUAL THERAPY 1/> REGIONS: CPT | Performed by: PHYSICAL THERAPIST

## 2019-11-06 PROCEDURE — 97110 THERAPEUTIC EXERCISES: CPT | Performed by: PHYSICAL THERAPIST

## 2019-11-06 PROCEDURE — 97014 ELECTRIC STIMULATION THERAPY: CPT | Performed by: PHYSICAL THERAPIST

## 2019-11-06 NOTE — PROGRESS NOTES
Daily Note     Today's date: 2019  Patient name: Migel Adjutant  :   MRN: [id number]  Referring provider: Bigg Castle DO  Dx:   Encounter Diagnosis     ICD-10-CM    1  Right shoulder pain, unspecified chronicity M25 511        Start Time: 1345  Stop Time: 1435  Total time in clinic (min): 50 minutes    Subjective: still sore      Objective: See treatment diary below      Assessment: Tolerated treatment well  Patient demonstrated fatigue post treatment, exhibited good technique with therapeutic exercises and would benefit from continued PT, decreased intensity today secondary to soreness      Plan: Progress treatment as tolerated         Precautions: HTN, Laminectomy,gastric bypass  Modalities  10/16 10/24 11/1 11/4 11/6        MH/ES 10 10 10 10 10                     Ice after   10' 10 10            Manual  10/16 10/24 11/1 11/4 11/6                     MT right shoulder 15 15 10' 10 10                                                   Exercise Diary  10/16 10/24 11/1 11/4 11/6        Cane FF  30x ea 30x  30x 30x        Cane CP   2# 30x 2/30 2/30        shld stab @ 90°   nv 2#                      UBE 4 min 4' 4' 4' 4'        ROW DELT 30 30/30 30# 30x 30/30 30/30        TB RTC [address] ea way 15x ea 30x ea 30x 30x        PNF D2 ext on FT   30x 10# 10/30 10/30

## 2019-11-13 ENCOUNTER — OFFICE VISIT (OUTPATIENT)
Dept: PHYSICAL THERAPY | Age: 59
End: 2019-11-13
Payer: COMMERCIAL

## 2019-11-13 DIAGNOSIS — M25.511 RIGHT SHOULDER PAIN, UNSPECIFIED CHRONICITY: Primary | ICD-10-CM

## 2019-11-13 PROCEDURE — 97140 MANUAL THERAPY 1/> REGIONS: CPT

## 2019-11-13 PROCEDURE — 97014 ELECTRIC STIMULATION THERAPY: CPT

## 2019-11-13 PROCEDURE — 97110 THERAPEUTIC EXERCISES: CPT

## 2019-11-13 PROCEDURE — G0283 ELEC STIM OTHER THAN WOUND: HCPCS

## 2019-11-13 NOTE — PROGRESS NOTES
Daily Note     Today's date: 2019  Patient name: Amina Maloney  :   MRN: [id number]  Referring provider: Cornelia Shane DO  Dx:   Encounter Diagnosis     ICD-10-CM    1  Right shoulder pain, unspecified chronicity M25 511        Start Time: 940  Stop Time: 1030  Total time in clinic (min): 50 minutes    Subjective: Patient reports no pain just stiffness, pain when lifting arm above his head  Objective: See treatment diary below      Assessment: Tolerated treatment well, continues to have to discomfort with end ranges FF and IR  Patient demonstrated fatigue post treatment and would benefit from continued PT      Plan: Continue per plan of care        Precautions: HTN, Laminectomy,gastric bypass  Modalities  10/16 10/24 11/1 11/4 11/6 11/13       MH/ES 10 10 10 10 10 10                    Ice after   10' 10 10 10           Manual  10/16 10/24 11/1 11/4 11/6 11/13                    MT right shoulder 15 15 10' 10 10 10                                                  Exercise Diary  10/16 10/24 11/1 11/4 11/6 11/13       Cane FF  30x ea 30x  30x 30x 30x       Cane CP   2# 30x 2/30 2/30 2/30       shld stab @ 90°   nv 2#                      UBE 4 min 4' 4' 4' 4' 5'       ROW DELT  30# 30x 30/30 30 35/30       TB RTC [address] ea way 15x ea 30x ea 30x 30x 25xea       PNF D2 ext on FT   30x 10# 10/30 10/30 10/30

## 2020-08-06 ENCOUNTER — TRANSCRIBE ORDERS (OUTPATIENT)
Dept: LAB | Facility: CLINIC | Age: 60
End: 2020-08-06

## 2020-08-06 ENCOUNTER — APPOINTMENT (OUTPATIENT)
Dept: LAB | Facility: CLINIC | Age: 60
End: 2020-08-06
Payer: COMMERCIAL

## 2020-08-06 DIAGNOSIS — E78.2 MIXED HYPERLIPIDEMIA: ICD-10-CM

## 2020-08-06 DIAGNOSIS — R94.39 ABNORMAL BALLISTOCARDIOGRAM: ICD-10-CM

## 2020-08-06 DIAGNOSIS — I10 ESSENTIAL HYPERTENSION, MALIGNANT: Primary | ICD-10-CM

## 2020-08-06 DIAGNOSIS — I48.0 PAROXYSMAL ATRIAL FIBRILLATION (HCC): ICD-10-CM

## 2020-08-06 DIAGNOSIS — I10 ESSENTIAL HYPERTENSION, MALIGNANT: ICD-10-CM

## 2020-08-06 LAB
ALBUMIN SERPL BCP-MCNC: 4 G/DL (ref 3.5–5)
ALP SERPL-CCNC: 71 U/L (ref 46–116)
ALT SERPL W P-5'-P-CCNC: 51 U/L (ref 12–78)
ANION GAP SERPL CALCULATED.3IONS-SCNC: 2 MMOL/L (ref 4–13)
AST SERPL W P-5'-P-CCNC: 39 U/L (ref 5–45)
BASOPHILS # BLD AUTO: 0.03 THOUSANDS/ΜL (ref 0–0.1)
BASOPHILS NFR BLD AUTO: 1 % (ref 0–1)
BILIRUB SERPL-MCNC: 1 MG/DL (ref 0.2–1)
BUN SERPL-MCNC: 12 MG/DL (ref 5–25)
CALCIUM SERPL-MCNC: 9.8 MG/DL (ref 8.3–10.1)
CHLORIDE SERPL-SCNC: 106 MMOL/L (ref 100–108)
CHOLEST SERPL-MCNC: 150 MG/DL (ref 50–200)
CO2 SERPL-SCNC: 30 MMOL/L (ref 21–32)
CREAT SERPL-MCNC: 1.02 MG/DL (ref 0.6–1.3)
EOSINOPHIL # BLD AUTO: 0.13 THOUSAND/ΜL (ref 0–0.61)
EOSINOPHIL NFR BLD AUTO: 3 % (ref 0–6)
ERYTHROCYTE [DISTWIDTH] IN BLOOD BY AUTOMATED COUNT: 12.2 % (ref 11.6–15.1)
GFR SERPL CREATININE-BSD FRML MDRD: 78 ML/MIN/1.73SQ M
GLUCOSE P FAST SERPL-MCNC: 99 MG/DL (ref 65–99)
HCT VFR BLD AUTO: 49.7 % (ref 36.5–49.3)
HDLC SERPL-MCNC: 83 MG/DL
HGB BLD-MCNC: 16.3 G/DL (ref 12–17)
IMM GRANULOCYTES # BLD AUTO: 0 THOUSAND/UL (ref 0–0.2)
IMM GRANULOCYTES NFR BLD AUTO: 0 % (ref 0–2)
LDLC SERPL CALC-MCNC: 59 MG/DL (ref 0–100)
LYMPHOCYTES # BLD AUTO: 0.92 THOUSANDS/ΜL (ref 0.6–4.47)
LYMPHOCYTES NFR BLD AUTO: 20 % (ref 14–44)
MCH RBC QN AUTO: 31.4 PG (ref 26.8–34.3)
MCHC RBC AUTO-ENTMCNC: 32.8 G/DL (ref 31.4–37.4)
MCV RBC AUTO: 96 FL (ref 82–98)
MONOCYTES # BLD AUTO: 0.52 THOUSAND/ΜL (ref 0.17–1.22)
MONOCYTES NFR BLD AUTO: 11 % (ref 4–12)
NEUTROPHILS # BLD AUTO: 3.1 THOUSANDS/ΜL (ref 1.85–7.62)
NEUTS SEG NFR BLD AUTO: 65 % (ref 43–75)
NONHDLC SERPL-MCNC: 67 MG/DL
NRBC BLD AUTO-RTO: 0 /100 WBCS
PLATELET # BLD AUTO: 199 THOUSANDS/UL (ref 149–390)
PMV BLD AUTO: 10.4 FL (ref 8.9–12.7)
POTASSIUM SERPL-SCNC: 4.1 MMOL/L (ref 3.5–5.3)
PROT SERPL-MCNC: 8 G/DL (ref 6.4–8.2)
RBC # BLD AUTO: 5.19 MILLION/UL (ref 3.88–5.62)
SODIUM SERPL-SCNC: 138 MMOL/L (ref 136–145)
TRIGL SERPL-MCNC: 39 MG/DL
TSH SERPL DL<=0.05 MIU/L-ACNC: 0.59 UIU/ML (ref 0.36–3.74)
WBC # BLD AUTO: 4.7 THOUSAND/UL (ref 4.31–10.16)

## 2020-08-06 PROCEDURE — 85025 COMPLETE CBC W/AUTO DIFF WBC: CPT

## 2020-08-06 PROCEDURE — 80061 LIPID PANEL: CPT

## 2020-08-06 PROCEDURE — 36415 COLL VENOUS BLD VENIPUNCTURE: CPT

## 2020-08-06 PROCEDURE — 84443 ASSAY THYROID STIM HORMONE: CPT

## 2020-08-06 PROCEDURE — 80053 COMPREHEN METABOLIC PANEL: CPT

## 2021-09-23 ENCOUNTER — APPOINTMENT (EMERGENCY)
Dept: RADIOLOGY | Facility: HOSPITAL | Age: 61
End: 2021-09-23
Payer: COMMERCIAL

## 2021-09-23 ENCOUNTER — HOSPITAL ENCOUNTER (OUTPATIENT)
Facility: HOSPITAL | Age: 61
Setting detail: OBSERVATION
Discharge: HOME | End: 2021-09-25
Attending: EMERGENCY MEDICINE | Admitting: STUDENT IN AN ORGANIZED HEALTH CARE EDUCATION/TRAINING PROGRAM
Payer: COMMERCIAL

## 2021-09-23 ENCOUNTER — APPOINTMENT (EMERGENCY)
Dept: CT IMAGING | Facility: HOSPITAL | Age: 61
End: 2021-09-23
Payer: COMMERCIAL

## 2021-09-23 DIAGNOSIS — N13.2 URETERAL STONE WITH HYDRONEPHROSIS: ICD-10-CM

## 2021-09-23 DIAGNOSIS — R26.2 AMBULATORY DYSFUNCTION: ICD-10-CM

## 2021-09-23 DIAGNOSIS — R42 DIZZINESS: Primary | ICD-10-CM

## 2021-09-23 DIAGNOSIS — F32.A DEPRESSION: ICD-10-CM

## 2021-09-23 PROBLEM — E78.00 HYPERCHOLESTEROLEMIA: Status: ACTIVE | Noted: 2017-01-17

## 2021-09-23 PROBLEM — I10 ESSENTIAL HYPERTENSION: Status: ACTIVE | Noted: 2017-10-15

## 2021-09-23 LAB
ALBUMIN SERPL BCP-MCNC: 3.4 G/DL (ref 3.5–5)
ALP SERPL-CCNC: 63 U/L (ref 46–116)
ALT SERPL W P-5'-P-CCNC: 17 U/L (ref 12–78)
ANION GAP SERPL CALCULATED.3IONS-SCNC: 8 MMOL/L (ref 4–13)
AST SERPL W P-5'-P-CCNC: 14 U/L (ref 5–45)
BASOPHILS # BLD AUTO: 0.01 THOUSANDS/ΜL (ref 0–0.1)
BASOPHILS NFR BLD AUTO: 0 % (ref 0–1)
BILIRUB SERPL-MCNC: 0.43 MG/DL (ref 0.2–1)
BUN SERPL-MCNC: 10 MG/DL (ref 5–25)
CALCIUM ALBUM COR SERPL-MCNC: 8.9 MG/DL (ref 8.3–10.1)
CALCIUM SERPL-MCNC: 8.4 MG/DL (ref 8.3–10.1)
CHLORIDE SERPL-SCNC: 105 MMOL/L (ref 100–108)
CO2 SERPL-SCNC: 26 MMOL/L (ref 21–32)
CREAT SERPL-MCNC: 0.82 MG/DL (ref 0.6–1.3)
EOSINOPHIL # BLD AUTO: 0.09 THOUSAND/ΜL (ref 0–0.61)
EOSINOPHIL NFR BLD AUTO: 2 % (ref 0–6)
ERYTHROCYTE [DISTWIDTH] IN BLOOD BY AUTOMATED COUNT: 12.4 % (ref 11.6–15.1)
GFR SERPL CREATININE-BSD FRML MDRD: 94 ML/MIN/1.73SQ M
GLUCOSE SERPL-MCNC: 97 MG/DL (ref 65–140)
HCT VFR BLD AUTO: 38 % (ref 36.5–49.3)
HGB BLD-MCNC: 12.8 G/DL (ref 12–17)
IMM GRANULOCYTES # BLD AUTO: 0.02 THOUSAND/UL (ref 0–0.2)
IMM GRANULOCYTES NFR BLD AUTO: 0 % (ref 0–2)
LYMPHOCYTES # BLD AUTO: 0.69 THOUSANDS/ΜL (ref 0.6–4.47)
LYMPHOCYTES NFR BLD AUTO: 14 % (ref 14–44)
MAGNESIUM SERPL-MCNC: 2.2 MG/DL (ref 1.6–2.6)
MCH RBC QN AUTO: 30.9 PG (ref 26.8–34.3)
MCHC RBC AUTO-ENTMCNC: 33.7 G/DL (ref 31.4–37.4)
MCV RBC AUTO: 92 FL (ref 82–98)
MONOCYTES # BLD AUTO: 0.51 THOUSAND/ΜL (ref 0.17–1.22)
MONOCYTES NFR BLD AUTO: 11 % (ref 4–12)
NEUTROPHILS # BLD AUTO: 3.5 THOUSANDS/ΜL (ref 1.85–7.62)
NEUTS SEG NFR BLD AUTO: 73 % (ref 43–75)
NRBC BLD AUTO-RTO: 0 /100 WBCS
PHOSPHATE SERPL-MCNC: 2.8 MG/DL (ref 2.3–4.1)
PLATELET # BLD AUTO: 135 THOUSANDS/UL (ref 149–390)
PMV BLD AUTO: 10.8 FL (ref 8.9–12.7)
POTASSIUM SERPL-SCNC: 3.6 MMOL/L (ref 3.5–5.3)
PROT SERPL-MCNC: 6.3 G/DL (ref 6.4–8.2)
RBC # BLD AUTO: 4.14 MILLION/UL (ref 3.88–5.62)
SODIUM SERPL-SCNC: 139 MMOL/L (ref 136–145)
TROPONIN I SERPL-MCNC: <0.02 NG/ML
WBC # BLD AUTO: 4.82 THOUSAND/UL (ref 4.31–10.16)

## 2021-09-23 PROCEDURE — 84100 ASSAY OF PHOSPHORUS: CPT | Performed by: PHYSICIAN ASSISTANT

## 2021-09-23 PROCEDURE — 70450 CT HEAD/BRAIN W/O DYE: CPT

## 2021-09-23 PROCEDURE — 93005 ELECTROCARDIOGRAM TRACING: CPT

## 2021-09-23 PROCEDURE — 99220 PR INITIAL OBSERVATION CARE/DAY 70 MINUTES: CPT | Performed by: INTERNAL MEDICINE

## 2021-09-23 PROCEDURE — 83735 ASSAY OF MAGNESIUM: CPT | Performed by: PHYSICIAN ASSISTANT

## 2021-09-23 PROCEDURE — 99285 EMERGENCY DEPT VISIT HI MDM: CPT

## 2021-09-23 PROCEDURE — 84484 ASSAY OF TROPONIN QUANT: CPT | Performed by: EMERGENCY MEDICINE

## 2021-09-23 PROCEDURE — 80053 COMPREHEN METABOLIC PANEL: CPT | Performed by: EMERGENCY MEDICINE

## 2021-09-23 PROCEDURE — 99285 EMERGENCY DEPT VISIT HI MDM: CPT | Performed by: PHYSICIAN ASSISTANT

## 2021-09-23 PROCEDURE — 85025 COMPLETE CBC W/AUTO DIFF WBC: CPT | Performed by: EMERGENCY MEDICINE

## 2021-09-23 PROCEDURE — 71045 X-RAY EXAM CHEST 1 VIEW: CPT

## 2021-09-23 PROCEDURE — 36415 COLL VENOUS BLD VENIPUNCTURE: CPT

## 2021-09-23 RX ORDER — FOLIC ACID 1 MG/1
1 TABLET ORAL ONCE
Status: COMPLETED | OUTPATIENT
Start: 2021-09-23 | End: 2021-09-23

## 2021-09-23 RX ORDER — HEPARIN SODIUM 5000 [USP'U]/ML
5000 INJECTION, SOLUTION INTRAVENOUS; SUBCUTANEOUS EVERY 8 HOURS SCHEDULED
Status: DISCONTINUED | OUTPATIENT
Start: 2021-09-23 | End: 2021-09-25 | Stop reason: HOSPADM

## 2021-09-23 RX ORDER — ACETAMINOPHEN 325 MG/1
650 TABLET ORAL EVERY 6 HOURS PRN
Status: DISCONTINUED | OUTPATIENT
Start: 2021-09-23 | End: 2021-09-25 | Stop reason: HOSPADM

## 2021-09-23 RX ORDER — LANOLIN ALCOHOL/MO/W.PET/CERES
100 CREAM (GRAM) TOPICAL ONCE
Status: COMPLETED | OUTPATIENT
Start: 2021-09-23 | End: 2021-09-23

## 2021-09-23 RX ADMIN — THIAMINE HCL TAB 100 MG 100 MG: 100 TAB at 19:35

## 2021-09-23 RX ADMIN — FOLIC ACID 1 MG: 1 TABLET ORAL at 19:34

## 2021-09-23 NOTE — ED PROVIDER NOTES
History  Chief Complaint   Patient presents with    Dizziness     dizziness for 1 year  denies CP/SOB  son states he has shuffling gate, bending over  sone states multiple falls over last year  pt denies any recent falls, denies hitting head  son states worsening attention and focus over last year as well as weight loss  58 yo male pt with dizziness  Pt here with son who assists in providing history  Has apparently had a steady decline in the past one year   about 3 years ago and has been abusing ETOH  Lives alone  As of about one month ago he stopped answering phone calls from his family  Son did a welfare check on him today and found him in poor condition  He has been having multiple falls, in the past one month has shuffling gait  Pt denies injury including head strike  He complains of feeling off balanced at times  He admits to continued ETOH abuse however "is cutting down"  Son notes he has poor nutrition/PO intake and feels that the pt is often giving up meals for ETOH  History provided by:  Patient   used: No    Dizziness  Quality:  Imbalance  Severity:  Moderate  Onset quality:  Gradual  Duration:  1 month  Timing:  Constant  Progression:  Worsening  Chronicity:  Chronic  Context: not when bending over, not with bowel movement, not with ear pain, not with eye movement, not with head movement, not with inactivity, not with loss of consciousness, not with medication, not with physical activity, not when standing up and not when urinating    Relieved by:  Nothing  Worsened by:  Nothing  Ineffective treatments:  None tried  Associated symptoms: no chest pain, no palpitations, no shortness of breath and no vomiting        Prior to Admission Medications   Prescriptions Last Dose Informant Patient Reported? Taking?    amLODIPine (NORVASC) 5 mg tablet   Yes No   Sig: Take 5 mg by mouth daily   ketorolac (TORADOL) 10 mg tablet   No No   Sig: Take 1 tablet (10 mg total) by mouth every 6 (six) hours as needed for moderate pain for up to 12 doses   tamsulosin (FLOMAX) 0 4 mg   No No   Sig: Take 1 capsule (0 4 mg total) by mouth daily with dinner for 10 days      Facility-Administered Medications: None       Past Medical History:   Diagnosis Date    Hypertension        Past Surgical History:   Procedure Laterality Date    GASTRIC BYPASS      LAMINECTOMY      L4-L5    WRIST GANGLION EXCISION Right        History reviewed  No pertinent family history  I have reviewed and agree with the history as documented  E-Cigarette/Vaping     E-Cigarette/Vaping Substances     Social History     Tobacco Use    Smoking status: Never Smoker    Smokeless tobacco: Never Used   Substance Use Topics    Alcohol use: Yes     Comment: rarely    Drug use: No       Review of Systems   Constitutional: Negative for chills and fever  HENT: Negative for ear pain and sore throat  Eyes: Negative for pain and visual disturbance  Respiratory: Negative for cough and shortness of breath  Cardiovascular: Negative for chest pain and palpitations  Gastrointestinal: Negative for abdominal pain and vomiting  Genitourinary: Negative for dysuria and hematuria  Musculoskeletal: Negative for arthralgias and back pain  Skin: Negative for color change and rash  Neurological: Positive for dizziness  Negative for seizures and syncope  All other systems reviewed and are negative  Physical Exam  Physical Exam  Vitals and nursing note reviewed  Constitutional:       Appearance: He is well-developed  HENT:      Head: Normocephalic and atraumatic  Eyes:      Conjunctiva/sclera: Conjunctivae normal    Cardiovascular:      Rate and Rhythm: Normal rate and regular rhythm  Heart sounds: No murmur heard  Pulmonary:      Effort: Pulmonary effort is normal  No respiratory distress  Breath sounds: Normal breath sounds  Abdominal:      Palpations: Abdomen is soft  Tenderness:  There is no abdominal tenderness  Musculoskeletal:      Cervical back: Neck supple  Skin:     General: Skin is warm and dry  Neurological:      Mental Status: He is alert  GCS: GCS eye subscore is 4  GCS verbal subscore is 5  GCS motor subscore is 6  Comments: GCS 15  AAOx3  No focal neuro deficits  CN II-XII grossly intact  Speech normal, no aphasia or dysarthria  No pronator drift  Finger to nose is slowed  PERRL  Limited downward gaze b/l, otherwise EOMI  Peripheral vision intact  No nystagmus  Upper and lower extremity strength 5/5 through   strength 5/5 b/l  Gross sensation intact b/l              Vital Signs  ED Triage Vitals [09/23/21 1404]   Temperature Pulse Respirations Blood Pressure SpO2   98 °F (36 7 °C) 61 17 137/79 98 %      Temp Source Heart Rate Source Patient Position - Orthostatic VS BP Location FiO2 (%)   Oral Monitor -- Left arm --      Pain Score       --           Vitals:    09/23/21 2100 09/23/21 2130 09/23/21 2200 09/23/21 2230   BP: 136/100 141/71 119/71 152/79   Pulse: 59 65 56 60         Visual Acuity      ED Medications  Medications   heparin (porcine) subcutaneous injection 5,000 Units (5,000 Units Subcutaneous Not Given 9/23/21 2249)   acetaminophen (TYLENOL) tablet 650 mg (has no administration in time range)   folic acid 1 mg, thiamine (VITAMIN B1) 100 mg in sodium chloride 0 9 % 100 mL IV piggyback (has no administration in time range)   thiamine tablet 100 mg (100 mg Oral Given 4/22/27 5319)   folic acid (FOLVITE) tablet 1 mg (1 mg Oral Given 9/23/21 1934)       Diagnostic Studies  Results Reviewed     Procedure Component Value Units Date/Time    Magnesium [[id number]]  (Normal) Collected: 09/23/21 1411    Lab Status: Final result Specimen: Blood from Arm, Left Updated: 09/23/21 1953     Magnesium 2 2 mg/dL     Phosphorus [[id number]]  (Normal) Collected: 09/23/21 1411    Lab Status: Final result Specimen: Blood from Arm, Left Updated: 09/23/21 1953     Phosphorus 2 8 mg/dL Troponin I [[id number]]  (Normal) Collected: 09/23/21 1411    Lab Status: Final result Specimen: Blood from Arm, Left Updated: 09/23/21 1434     Troponin I <0 02 ng/mL     Comprehensive metabolic panel [[id number]]  (Abnormal) Collected: 09/23/21 1411    Lab Status: Final result Specimen: Blood from Arm, Left Updated: 09/23/21 1431     Sodium 139 mmol/L      Potassium 3 6 mmol/L      Chloride 105 mmol/L      CO2 26 mmol/L      ANION GAP 8 mmol/L      BUN 10 mg/dL      Creatinine 0 82 mg/dL      Glucose 97 mg/dL      Calcium 8 4 mg/dL      Corrected Calcium 8 9 mg/dL      AST 14 U/L      ALT 17 U/L      Alkaline Phosphatase 63 U/L      Total Protein 6 3 g/dL      Albumin 3 4 g/dL      Total Bilirubin 0 43 mg/dL      eGFR 94 ml/min/1 73sq m     Narrative:      National Kid[employer] Disease Foundation guidelines for Chronic Kidney Disease (CKD):     Stage 1 with normal or high GFR (GFR > 90 mL/min/1 73 square meters)    Stage 2 Mild CKD (GFR = 60-89 mL/min/1 73 square meters)    Stage 3A Moderate CKD (GFR = 45-59 mL/min/1 73 square meters)    Stage 3B Moderate CKD (GFR = 30-44 mL/min/1 73 square meters)    Stage 4 Severe CKD (GFR = 15-29 mL/min/1 73 square meters)    Stage 5 End Stage CKD (GFR <15 mL/min/1 73 square meters)  Note: GFR calculation is accurate only with a steady state creatinine    CBC and differential [[id number]]  (Abnormal) Collected: 09/23/21 1411    Lab Status: Final result Specimen: Blood from Arm, Left Updated: 09/23/21 1430     WBC 4 82 Thousand/uL      RBC 4 14 Million/uL      Hemoglobin 12 8 g/dL      Hematocrit 38 0 %      MCV 92 fL      MCH 30 9 pg      MCHC 33 7 g/dL      RDW 12 4 %      MPV 10 8 fL      Platelets 706 Thousands/uL      nRBC 0 /100 WBCs      Neutrophils Relative 73 %      Immat GRANS % 0 %      Lymphocytes Relative 14 %      Monocytes Relative 11 %      Eosinophils Relative 2 %      Basophils Relative 0 %      Neutrophils Absolute 3 50 Thousands/µL      Immature Grans Absolute 0 02 Thousand/uL      Lymphocytes Absolute 0 69 Thousands/µL      Monocytes Absolute 0 51 Thousand/µL      Eosinophils Absolute 0 09 Thousand/µL      Basophils Absolute 0 01 Thousands/µL                  CT head without contrast   Final Result by Kaela Craft DO (09/23 2040)      No acute intracranial abnormality  Sinus disease                  Workstation performed: [id number]         XR chest 1 view portable   Final Result by Nasim Wolfe MD (09/23 1822)      No acute cardiopulmonary disease  Workstation performed: [id number]                    Procedures  ECG 12 Lead Documentation Only    Date/Time: 9/23/2021 10:50 PM  Performed by: Kristy Gunn PA-C  Authorized by: Kristy Gunn PA-C     ECG reviewed by me, the ED Provider: yes    Patient location:  ED  Interpretation:     Interpretation: normal    Quality:     Tracing quality:  Limited by artifact  Rate:     ECG rate:  63    ECG rate assessment: normal    Rhythm:     Rhythm: sinus rhythm    Ectopy:     Ectopy: none    QRS:     QRS axis:  Normal    QRS intervals:  Normal  Conduction:     Conduction: normal    ST segments:     ST segments:  Normal  T waves:     T waves: non-specific               ED Course             HEART Risk Score      Most Recent Value   Heart Score Risk Calculator   History  0 Filed at: 09/23/2021 2252   ECG  1 Filed at: 09/23/2021 2252   Age  1 Filed at: 09/23/2021 2252   Risk Factors  1 Filed at: 09/23/2021 2252   Troponin  0 Filed at: 09/23/2021 2252   HEART Score  3 Filed at: 09/23/2021 2252                                    MDM  Number of Diagnoses or Management Options  Ambulatory dysfunction: new and requires workup  Dizziness: new and requires workup  Diagnosis management comments: ddx includes but is not limited to tia, cva, ICH, meningitis, encephalitis, cardiac etiology, wernickes encephalopathy, dehydration, renal failure, metabolic derangement  Plan: cardiac workup  CT head   Thiamine and folate for concern of possible wernicke's  Amount and/or Complexity of Data Reviewed  Clinical lab tests: ordered and reviewed  Tests in the radiology section of CPT®: reviewed and ordered  Independent visualization of images, tracings, or specimens: yes    Risk of Complications, Morbidity, and/or Mortality  Presenting problems: moderate  Management options: low  General comments: 60 yo with dizziness  CT head negative  Labs overall unremarkable  Cannot r/o wernickes  Additionally given his multiple falls and living alone he is not safe to  Be home alone  Will admit for further evaluation  May need PT/OT and neuro eval  Pt and son agree with plan  Patient Progress  Patient progress: stable      Disposition  Final diagnoses:   Dizziness   Ambulatory dysfunction     Time reflects when diagnosis was documented in both MDM as applicable and the Disposition within this note     Time User Action Codes Description Comment    9/23/2021  9:29 PM Dortha Franci Add [R42] Dizziness     9/23/2021  9:29 PM Dortha Franci Add [R26 2] Ambulatory dysfunction       ED Disposition     ED Disposition Condition Date/Time Comment    Admit Stable Thu Sep 23, 2021  9:29 PM Case was discussed with Rudolph Neville and the patient's admission status was agreed to be Admission Status: observation status to the service of Dr Chiquis Heath   Follow-up Information    None         Patient's Medications   Discharge Prescriptions    No medications on file     No discharge procedures on file      PDMP Review     None          ED Provider  Electronically Signed by           Speedy Allison PA-C  09/23/21 [address] Blade Goldman PA-C  09/23/21 4038

## 2021-09-24 ENCOUNTER — APPOINTMENT (OUTPATIENT)
Dept: CT IMAGING | Facility: HOSPITAL | Age: 61
End: 2021-09-24
Payer: COMMERCIAL

## 2021-09-24 PROBLEM — F32.A DEPRESSION: Status: ACTIVE | Noted: 2021-09-24

## 2021-09-24 PROBLEM — I48.0 PAF (PAROXYSMAL ATRIAL FIBRILLATION) (HCC): Status: ACTIVE | Noted: 2021-09-24

## 2021-09-24 PROCEDURE — 99225 PR SBSQ OBSERVATION CARE/DAY 25 MINUTES: CPT | Performed by: STUDENT IN AN ORGANIZED HEALTH CARE EDUCATION/TRAINING PROGRAM

## 2021-09-24 PROCEDURE — G1004 CDSM NDSC: HCPCS

## 2021-09-24 PROCEDURE — 70498 CT ANGIOGRAPHY NECK: CPT

## 2021-09-24 PROCEDURE — 97162 PT EVAL MOD COMPLEX 30 MIN: CPT

## 2021-09-24 PROCEDURE — 99243 OFF/OP CNSLTJ NEW/EST LOW 30: CPT | Performed by: PSYCHIATRY & NEUROLOGY

## 2021-09-24 PROCEDURE — 97166 OT EVAL MOD COMPLEX 45 MIN: CPT

## 2021-09-24 PROCEDURE — 70496 CT ANGIOGRAPHY HEAD: CPT

## 2021-09-24 PROCEDURE — 94762 N-INVAS EAR/PLS OXIMTRY CONT: CPT

## 2021-09-24 PROCEDURE — 97116 GAIT TRAINING THERAPY: CPT

## 2021-09-24 PROCEDURE — 99244 OFF/OP CNSLTJ NEW/EST MOD 40: CPT | Performed by: PSYCHIATRY & NEUROLOGY

## 2021-09-24 RX ORDER — DULOXETIN HYDROCHLORIDE 30 MG/1
30 CAPSULE, DELAYED RELEASE ORAL 2 TIMES DAILY
Status: DISCONTINUED | OUTPATIENT
Start: 2021-09-24 | End: 2021-09-25 | Stop reason: HOSPADM

## 2021-09-24 RX ORDER — LANOLIN ALCOHOL/MO/W.PET/CERES
100 CREAM (GRAM) TOPICAL DAILY
Status: DISCONTINUED | OUTPATIENT
Start: 2021-09-24 | End: 2021-09-25 | Stop reason: HOSPADM

## 2021-09-24 RX ORDER — FOLIC ACID 1 MG/1
1 TABLET ORAL DAILY
Status: DISCONTINUED | OUTPATIENT
Start: 2021-09-24 | End: 2021-09-25 | Stop reason: HOSPADM

## 2021-09-24 RX ORDER — MIRTAZAPINE 15 MG/1
7.5 TABLET, FILM COATED ORAL
Status: DISCONTINUED | OUTPATIENT
Start: 2021-09-24 | End: 2021-09-25 | Stop reason: HOSPADM

## 2021-09-24 RX ORDER — LOSARTAN POTASSIUM 25 MG/1
25 TABLET ORAL DAILY
COMMUNITY
Start: 2021-04-15 | End: 2022-04-15

## 2021-09-24 RX ORDER — AMLODIPINE BESYLATE 5 MG/1
5 TABLET ORAL DAILY
Status: DISCONTINUED | OUTPATIENT
Start: 2021-09-24 | End: 2021-09-25 | Stop reason: HOSPADM

## 2021-09-24 RX ORDER — TAMSULOSIN HYDROCHLORIDE 0.4 MG/1
0.4 CAPSULE ORAL
Status: DISCONTINUED | OUTPATIENT
Start: 2021-09-24 | End: 2021-09-25 | Stop reason: HOSPADM

## 2021-09-24 RX ADMIN — FOLIC ACID: 5 INJECTION, SOLUTION INTRAMUSCULAR; INTRAVENOUS; SUBCUTANEOUS at 08:55

## 2021-09-24 RX ADMIN — HEPARIN SODIUM 5000 UNITS: 5000 INJECTION INTRAVENOUS; SUBCUTANEOUS at 12:55

## 2021-09-24 RX ADMIN — IOHEXOL 85 ML: 350 INJECTION, SOLUTION INTRAVENOUS at 11:54

## 2021-09-24 RX ADMIN — AMLODIPINE BESYLATE 5 MG: 5 TABLET ORAL at 08:56

## 2021-09-24 RX ADMIN — FOLIC ACID 1 MG: 1 TABLET ORAL at 12:56

## 2021-09-24 RX ADMIN — TAMSULOSIN HYDROCHLORIDE 0.4 MG: 0.4 CAPSULE ORAL at 17:28

## 2021-09-24 RX ADMIN — THIAMINE HCL TAB 100 MG 100 MG: 100 TAB at 12:56

## 2021-09-24 RX ADMIN — ACETAMINOPHEN 650 MG: 325 TABLET, FILM COATED ORAL at 20:30

## 2021-09-24 RX ADMIN — HEPARIN SODIUM 5000 UNITS: 5000 INJECTION INTRAVENOUS; SUBCUTANEOUS at 22:40

## 2021-09-24 RX ADMIN — HEPARIN SODIUM 5000 UNITS: 5000 INJECTION INTRAVENOUS; SUBCUTANEOUS at 08:55

## 2021-09-24 RX ADMIN — DULOXETINE HYDROCHLORIDE 30 MG: 30 CAPSULE, DELAYED RELEASE ORAL at 20:30

## 2021-09-24 RX ADMIN — MIRTAZAPINE 7.5 MG: 15 TABLET, FILM COATED ORAL at 22:36

## 2021-09-24 RX ADMIN — MULTIPLE VITAMINS W/ MINERALS TAB 1 TABLET: TAB ORAL at 12:56

## 2021-09-24 NOTE — PROGRESS NOTES
[address] [facility]  Progress Note Emily Saini 1960, 59 y o  male MRN: [id number]  Unit/Bed#: MS [facility] Encounter: [id number]  Primary Care Provider: No primary care provider on file  Date and time admitted to hospital: 9/23/2021  7:14 PM    * Ambulatory dysfunction  Assessment & Plan  Patient with worsening generalized weakness and multiple recent falls  Per son has had worsening gait over the past 1 year with acute worsening over the past few weeks  Possibly the setting of Wernickies  PT/OT to evaluate for placement  Case management consulted  Will check vit E95 and folic acid as patient has long standing alcohol history  Started on Vit R32 and folic acid supplementation  Ambulatory dysfunction could be from deconditioning from being inactive and spending most of the day sitting around          Dizziness  Assessment & Plan  Presented with dizziness which has been ongoing for the past 1 year with associated confusion  Possibly secondary to alcohol abuse  Neuro consulted appreciate recommendations  MRI brain from a year ago was normal when patient had similar sx  Will not repeat  CT head showed no acute abnormalities  CTA head/neck negative for occlusions  Orthostatic vitals were negative    Depression  Assessment & Plan  Pt admits to being depressed and has been depressed for a long time  Denies any SI or HI  Pt states he would like to be seen by psych  Consult placed, appreciate recommendations     Spoke with psych Dr Ovidio Medeiros  Recommend cymbalta 30 mg po bid for depression and neuropathic pain  After the 1st week dosing should be consolidated to 60 mg daily  Also recommend remeron 7 5 mg qhs for insomnia and appetite stimulant  Recommend outpatient dual diagnosis psychiatric follow up treatment  Starting the patient out with partial hospitalization or intensive outpatient treatment would be most ideal if available       PAF (paroxysmal atrial fibrillation) (HCC)  Assessment & Plan  Pt used to be on coumadin, no longer on coumadin  He is status post ablation  Continue home ASA and metoprolol      Essential hypertension  Assessment & Plan  Continue home amlodipine          VTE Pharmacologic Prophylaxis: VTE Score: 5 High Risk (Score >/= 5) - Pharmacological DVT Prophylaxis Ordered: heparin  Sequential Compression Devices Ordered  Patient Centered Rounds: I performed bedside rounds with nursing staff today  Discussions with Specialists or Other Care Team Provider: Neuro    Education and Discussions with Family / Patient: Updated  (son) at bedside  Time Spent for Care: 30 minutes  More than 50% of total time spent on counseling and coordination of care as described above  Current Length of Stay: 0 day(s)  Current Patient Status: Observation   Certification Statement: The patient will continue to require additional inpatient hospital stay due to pending psych evaluation  Discharge Plan: Anticipate discharge in 24-48 hrs to discharge location to be determined pending rehab evaluations  Code Status: Level 1 - Full Code    Subjective:   Patient seen examined at bedside  Patient missed being depressed denies any suicidal homicidal ideation  Patient also complains of being dizzy and describes as lightheadedness when standing  Patient's son states that he has been having multiple falls over the last year there have been progressively worsening  He also states that his gait has been worsening he states that he has a shuffling gait  Patient currently denies any chest pain shortness of breath abdominal pain nausea vomiting diarrhea  Objective:     Vitals:   Temp (24hrs), Av 4 °F (36 9 °C), Min:98 4 °F (36 9 °C), Max:98 4 °F (36 9 °C)    Temp:  [98 4 °F (36 9 °C)] 98 4 °F (36 9 °C)  HR:  [51-77] 63  Resp:  [1-28] 18  BP: (105-158)/() 120/77  SpO2:  [97 %-100 %] 97 %  Body mass index is 25 38 kg/m²       Input and Output Summary (last 24 hours): Intake/Output Summary (Last 24 hours) at 9/24/2021 1816  Last data filed at 9/24/2021 1056  Gross per 24 hour   Intake 100 ml   Output --   Net 100 ml       Physical Exam:   Physical Exam  Vitals reviewed  HENT:      Head: Normocephalic and atraumatic  Right Ear: External ear normal       Left Ear: External ear normal       Nose: Nose normal       Mouth/Throat:      Mouth: Mucous membranes are moist       Pharynx: Oropharynx is clear  Eyes:      Extraocular Movements: Extraocular movements intact  Conjunctiva/sclera: Conjunctivae normal    Cardiovascular:      Rate and Rhythm: Normal rate and regular rhythm  Pulses: Normal pulses  Heart sounds: Normal heart sounds  Pulmonary:      Effort: Pulmonary effort is normal       Breath sounds: Normal breath sounds  Abdominal:      General: Abdomen is flat  Palpations: Abdomen is soft  Musculoskeletal:         General: Normal range of motion  Cervical back: Neck supple  Skin:     General: Skin is warm and dry  Neurological:      General: No focal deficit present  Mental Status: He is alert  Mental status is at baseline     Psychiatric:      Comments: Admits to being depressed, denies any hi or si            Additional Data:     Labs:  Results from last 7 days   Lab Units 09/23/21  1411   WBC Thousand/uL 4 82   HEMOGLOBIN g/dL 12 8   HEMATOCRIT % 38 0   PLATELETS Thousands/uL 135*   NEUTROS PCT % 73   LYMPHS PCT % 14   MONOS PCT % 11   EOS PCT % 2     Results from last 7 days   Lab Units 09/23/21  1411   SODIUM mmol/L 139   POTASSIUM mmol/L 3 6   CHLORIDE mmol/L 105   CO2 mmol/L 26   BUN mg/dL 10   CREATININE mg/dL 0 82   ANION GAP mmol/L 8   CALCIUM mg/dL 8 4   ALBUMIN g/dL 3 4*   TOTAL BILIRUBIN mg/dL 0 43   ALK PHOS U/L 63   ALT U/L 17   AST U/L 14   GLUCOSE RANDOM mg/dL 97                       Lines/Drains:  Invasive Devices     Peripheral Intravenous Line            Peripheral IV 11/22/18 Left Antecubital 1037 days Imaging: Reviewed radiology reports from this admission including: chest xray and CT head    Recent Cultures (last 7 days):         Last 24 Hours Medication List:   Current Facility-Administered Medications   Medication Dose Route Frequency Provider Last Rate    acetaminophen  650 mg Oral Q6H PRN Elton Bolaños MD      amLODIPine  5 mg Oral Daily Elton Bolaños MD      folic acid  1 mg Oral Daily Cintia Almaguer DO      heparin (porcine)  5,000 Units Subcutaneous Q8H [address] Elton Bolaños MD      multivitamin-minerals  1 tablet Oral Daily Cintia Almaguer DO      tamsulosin  0 4 mg Oral Daily With Graham Turner MD      thiamine  100 mg Oral Daily Cintia Almaguer DO          Today, Patient Was Seen By: Loretta Camarena DO    **Please Note: This note may have been constructed using a voice recognition system  **

## 2021-09-24 NOTE — UTILIZATION REVIEW
Initial Clinical Review    Admission: Date/Time/Statement:   Admission Orders (From admission, onward)     Ordered        09/23/21 2131  Place in Observation  Once                   Orders Placed This Encounter   Procedures    Place in Observation     Standing Status:   Standing     Number of Occurrences:   1     Order Specific Question:   Level of Care     Answer:   Med Surg [16]     ED Arrival Information     Expected Arrival Acuity    - 9/23/2021 13:37 Urgent         Means of arrival Escorted by Service Admission type    Walk-In Family Member Hospitalist Urgent         Arrival complaint    Syncope        Chief Complaint   Patient presents with    Dizziness     dizziness for 1 year  denies CP/SOB  son states he has shuffling gate, bending over  sone states multiple falls over last year  pt denies any recent falls, denies hitting head  son states worsening attention and focus over last year as well as weight loss  Initial Presentation: 60 yo male to ED from home w/ dizziness and multiple falls   Dizziness for the past year , acute worsening   Admitted OBS status w/ amb dysfunction possibly sec to alcohol abuse   Consider neuro consult , MRI brain , PT OT   Possibly the setting of wernickies       ED Triage Vitals   Temperature Pulse Respirations Blood Pressure SpO2   09/23/21 1404 09/23/21 1404 09/23/21 1404 09/23/21 1404 09/23/21 1404   98 °F (36 7 °C) 61 17 137/79 98 %      Temp Source Heart Rate Source Patient Position - Orthostatic VS BP Location FiO2 (%)   09/23/21 1404 09/23/21 1404 -- 09/23/21 1404 --   Oral Monitor  Left arm       Pain Score       09/24/21 0317       No Pain          Wt Readings from Last 1 Encounters:   09/23/21 79 7 kg (175 lb 11 3 oz)     Additional Vital Signs:   09/24/21 1330  --  77  16  131/77  98  98 %  --  --   09/24/21 1115  --  56  16  137/84  106  98 %  --  --   09/24/21 1105  --  66  14  137/84  --  --  --  Standing - Orthostatic VS   09/24/21 1103  --  59  14  134/79 --  --  --  Sitting - Orthostatic VS   09/24/21 1101  --  65  16  109/67  --  --  --  Lying - Orthostatic        09/24/21 0700  --  58  20  139/77  104  100 %  --   09/24/21 0600  --  52Abnormal   --  137/76  97  100 %  --   09/24/21 0530  --  51Abnormal   --  143/75  102  99 %  --   09/24/21 0315  --  51Abnormal   --  124/67  89  99 %  --   09/24/21 0230  --  54Abnormal   --  105/56  75  99 %  --   09/24/21 0200  --  55  --  122/59  82  99 %  --   09/23/21 2230  --  60  26Abnormal   152/79  105  99 %  --   09/23/21 2200  --  56  1Abnormal   119/71  91  99 %  --   09/23/21 2130  --  65  18  141/71  98  100 %  --   09/23/21 2100  --  59  20  136/100  113  100 %  --   09/23/21 2034  --  60  --  158/95  --  --  --   09/23/21 2000  --  51Abnormal   23Abnormal   151/81  107  100 %  --   09/23/21 1930  --  57  28Abnormal   134/82  103  100 %         Pertinent Labs/Diagnostic Test Results:   9/23 EKG  Rhythm: sinus rhythm     Ectopy:     Ectopy: none     QRS:     QRS axis:  Normal     QRS intervals:  Normal   Conduction:     Conduction: normal     ST segments:     ST segments:  Normal   T waves:     T waves: non-specific    9/23 CT head No acute intracranial abnormality    Sinus disease  9/23 PCXR No acute cardiopulmonary disease    Results from last 7 days   Lab Units 09/23/21  1411   WBC Thousand/uL 4 82   HEMOGLOBIN g/dL 12 8   HEMATOCRIT % 38 0   PLATELETS Thousands/uL 135*   NEUTROS ABS Thousands/µL 3 50     Results from last 7 days   Lab Units 09/23/21  1411   SODIUM mmol/L 139   POTASSIUM mmol/L 3 6   CHLORIDE mmol/L 105   CO2 mmol/L 26   ANION GAP mmol/L 8   BUN mg/dL 10   CREATININE mg/dL 0 82   EGFR ml/min/1 73sq m 94   CALCIUM mg/dL 8 4   MAGNESIUM mg/dL 2 2   PHOSPHORUS mg/dL 2 8     Results from last 7 days   Lab Units 09/23/21  1411   AST U/L 14   ALT U/L 17   ALK PHOS U/L 63   TOTAL PROTEIN g/dL 6 3*   ALBUMIN g/dL 3 4*   TOTAL BILIRUBIN mg/dL 0 43     Results from last 7 days   Lab Units 09/23/21  1411 GLUCOSE RANDOM mg/dL 97     Results from last 7 days   Lab Units 09/23/21  1411   TROPONIN I ng/mL <0 02     ED Treatment:   Medication Administration from 09/23/2021 1337 to 09/24/2021 2803       Date/Time Order Dose Route Action     09/23/2021 1935 thiamine tablet 100 mg 100 mg Oral Given     38/52/4220 3074 folic acid (FOLVITE) tablet 1 mg 1 mg Oral Given        Past Medical History:   Diagnosis Date    Hypertension      Present on Admission:   Essential hypertension      Admitting Diagnosis: Syncope [R55]  Age/Sex: 59 y o  male  Admission Orders:  Scheduled Medications:  amLODIPine, 5 mg, Oral, Daily  folic acid 1 mg, thiamine 100 mg in 0 9% sodium chloride 100 mL IVPB, , Intravenous, Daily  heparin (porcine), 5,000 Units, Subcutaneous, Q8H NILESH  tamsulosin, 0 4 mg, Oral, Daily With Dinner      Continuous IV Infusions:     PRN Meds:  acetaminophen, 650 mg, Oral, Q6H PRN    orthostatic BP   OT PT speech eval   CIWA  Score 2 for tremor      Network Utilization Review Department  ATTENTION: Please call with any questions or concerns to [phone] and carefully listen to the prompts so that you are directed to the right person  All voicemails are confidential   Aiyana Rj all requests for admission clinical reviews, approved or denied determinations and any other requests to dedicated fax number below belonging to the campus where the patient is receiving treatment   List of dedicated fax numbers for the Facilities:  [address] DENIALS (Administrative/Medical Necessity) [phone]   [address] (Maternity/NICU/Pediatrics) [phone]   [address] [place] [address] [address] [place] [id number] [address] Maryam [place] [address] Aaron Dhillon [address] [phone]

## 2021-09-24 NOTE — PLAN OF CARE
Problem: PHYSICAL THERAPY ADULT  Goal: Performs mobility at highest level of function for planned discharge setting  See evaluation for individualized goals  Description: Treatment/Interventions: Functional transfer training, LE strengthening/ROM, Elevations, Therapeutic exercise, Patient/family training, Equipment eval/education, Bed mobility, Gait training, Spoke to nursing, OT (higher level balance training)  Equipment Recommended: Guille [place]       See flowsheet documentation for full assessment, interventions and recommendations  9/24/2021 1012 by Mp Addison PT  Note: Prognosis: Good  Problem List: Decreased strength, Impaired balance, Decreased mobility  Assessment: Pt is 59 y o  male seen for moderate-complexity PT evaluation on 9/24/2021 s/p admit to [facility] on 9/23/2021 w/ Ambulatory dysfunction  PT was consulted to assess pt's functional mobility and d/c needs  Order placed for PT eval and tx, w/ up and OOB as tolerated order  PTA, pt resides alone in [facility] c [address]; has had multiple falls "fall daily"; (+) ; retired  At time of eval, performing all phases of mobility at SBA level without overt LOB observed c level surface ambulation and stair trial  Upon evaluation, pt presenting with impaired functional mobility d/t decreased strength, impaired balance, decreased mobility  Pertinent PMHx and current co-morbidities affecting pt's physical performance at time of assessment include: dizziness, HTN, ambulatory dysfunction, ETOH abuse  Personal factors affecting pt at time of eval include: stairs to enter home, limited home support and positive fall history  The following objective measures performed on IE also reveal limitations: Barthel Index: 85/100, Modified [place]: 2 (slight disability) and AM-PAC 6-Clicks: 93/30   Pt's clinical presentation is currently evolving seen in pt's presentation of abnormal lab value(s), need for input for task focus and mobility technique and ongoing medical assessment  Overall, pt's rehab potential and prognosis to return to PLOF is good as impacted by objective findings, warranting pt to receive further skilled PT interventions to address identified impairments, activity limitation(s), and participation restriction(s)  Pt to benefit from continued PT tx to address deficits as defined above and maximize level of functional independent mobility and consistency in order for pt to return home, ambulate household & community distances c least restrictive AD  From PT/mobility standpoint, recommendation at time of d/c would be home with outpatient rehabilitation pending progress in order to facilitate return to PLOF  Barriers to Discharge: Inaccessible home environment, Decreased caregiver support        PT Discharge Recommendation: Home with outpatient rehabilitation     PT - OK to Discharge: Yes    See flowsheet documentation for full assessment  9/24/2021 1011 by Martha Go PT  Note: Prognosis: Good  Problem List: Decreased strength, Impaired balance, Decreased mobility  Assessment: Pt is 59 y o  male seen for moderate-complexity PT evaluation on 9/24/2021 s/p admit to [facility] on 9/23/2021 w/ Ambulatory dysfunction  PT was consulted to assess pt's functional mobility and d/c needs  Order placed for PT eval and tx, w/ up and OOB as tolerated order  PTA, pt resides alone in [facility] [address]; has had multiple falls "fall daily"; (+) ; retired  At time of eval, performing all phases of mobility at SBA level without overt LOB observed c level surface ambulation and stair trial  Upon evaluation, pt presenting with impaired functional mobility d/t decreased strength, impaired balance, decreased mobility  Pertinent PMHx and current co-morbidities affecting pt's physical performance at time of assessment include: dizziness, HTN, ambulatory dysfunction, ETOH abuse   Personal factors affecting pt at time of eval include: stairs to enter home, limited home support and positive fall history  The following objective measures performed on IE also reveal limitations: Barthel Index: 85/100, Modified [place]: 2 (slight disability) and AM-PAC 6-Clicks: 84/76  Pt's clinical presentation is currently evolving seen in pt's presentation of abnormal lab value(s), need for input for task focus and mobility technique and ongoing medical assessment  Overall, pt's rehab potential and prognosis to return to PLOF is good as impacted by objective findings, warranting pt to receive further skilled PT interventions to address identified impairments, activity limitation(s), and participation restriction(s)  Pt to benefit from continued PT tx to address deficits as defined above and maximize level of functional independent mobility and consistency in order for pt to return home, ambulate household & community distances c least restrictive AD  From PT/mobility standpoint, recommendation at time of d/c would be home with outpatient rehabilitation pending progress in order to facilitate return to PLOF  Barriers to Discharge: Inaccessible home environment, Decreased caregiver support        PT Discharge Recommendation: Home with outpatient rehabilitation     PT - OK to Discharge: Yes    See flowsheet documentation for full assessment
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
## 2021-09-24 NOTE — PLAN OF CARE
Problem: OCCUPATIONAL THERAPY ADULT  Goal: Performs self-care activities at highest level of function for planned discharge setting  See evaluation for individualized goals  Description: Treatment Interventions: ADL retraining, Functional transfer training, Endurance training, Patient/family training, Compensatory technique education, Continued evaluation, Activityengagement          See flowsheet documentation for full assessment, interventions and recommendations  Note: Limitation: Decreased ADL status, Decreased self-care trans, Decreased high-level ADLs, Decreased Safe judgement during ADL  Prognosis: Good  Assessment: Patient is a 59 y o  male seen for OT evaluation s/p admit to [id number] [place] [address] on 9/23/2021 w/Ambulatory dysfunction  Commorbidities affecting patient's functional performance at time of assessment include: dizziness and essential HTN  Patient  has a past medical history of Hypertension  Orders placed for OT evaluation and treatment  Performed at least two patient identifiers during session including name and wristband  Prior to admission, patient was living alone in a one-story home with 3 KANE  At baseline, patient reports that he is independent in both ADLs and IADLs and is ambulatory with no AD  Personal factors affecting patient at time of initial evaluation include: limited caregiver support, difficulty performing IADLs and health management  Upon evaluation, patient requires supervision and set up assist for UB ADLs, supervision and set up assist for LB ADLs, transfers and functional ambulation in room and bathroom with supervision and contact guard assist, with the use of no AD vs SPC  Patient is alert and oriented x 4  Occupational performance is affected by the following deficits: flat affect, impaired gross motor coordination, dynamic sit/ stand balance deficit with poor standing tolerance time for self care and functional mobility and increased pain   Patient to benefit from continued Occupational Therapy treatment while in the hospital to address deficits as defined above and maximize level of functional independence with ADLs and functional mobility  Occupational Performance areas to address include: bathing/ shower, transfer to all surfaces, functional mobility, community mobility, emergency response, health maintenance, medication routine/ management, IADLS: Household maintenance, Leisure Participation and Social participation  From OT standpoint, recommendation at time of d/c would be home with increased social support         OT Discharge Recommendation: No rehabilitation needs

## 2021-09-24 NOTE — ASSESSMENT & PLAN NOTE
Neurology is asked to evaluate this 38-year-old right-handed gentleman (with a history of PAS controlled on aspirin and metoprolol, hypertension compliant on 2 antihypertensives, and a history of 2 prior lower back surgeries) who is brought to the ED yesterday for complaints of dizziness and shuffling gait with multiple falls  Discussion with the patient and his son at the bedside do not seem to elicit particular triggers as to why this patient has had the number of falls that he has had over the past year  He has not had major injuries with any of these events however he has had different scratches and lacerations both on his head as well as his arms  Although he has reportedly had a right footdrop after 1 of his previous back surgeries on his exam today there is no dorsiflexion weakness in either of his feet  He had no postural instabilities with cuing for attention on today's exam   He did not have any lateralizing weakness that would be responsible for his falls either  I suspect it may be cognitively related possibly due to his depression and past alcohol usage  He would benefit from a supervised living program I believe with PT rehab in the short term

## 2021-09-24 NOTE — OCCUPATIONAL THERAPY NOTE
Occupational Therapy Evaluation Note        Patient Name: Krista HERNANDEZ Date: 9/24/2021 09/24/21 0756   OT Last Visit   OT Visit Date 09/24/21   Note Type   Note type Evaluation   Restrictions/Precautions   Weight Bearing Precautions Per Order No   Braces or Orthoses Other (Comment)  (none per pt)   Other Precautions Telemetry; Fall Risk   Pain Assessment   Pain Assessment Tool 0-10   Pain Score 5   Pain Location/Orientation Location: Generalized   Hospital Pain Intervention(s) Repositioned; Ambulation/increased activity   Home Living   Type of [address] One level;Performs ADLs on one level; Other (Comment); Stairs to enter with rails  (3 KANE)   Bathroom Shower/Tub Tub/shower unit   Bathroom Toilet Standard   Bathroom Equipment Grab bars in shower   [address] Other (Comment)  (no AD used at baseline)   Prior Function   Level of [place] Independent with ADLs and functional mobility   Lives With Alone   Receives Help From Family  (pt reports closest family lives 2 hours away)   ADL Assistance Independent   IADLs Independent   Falls in the last 6 months >10  ("every day")   Vocational Retired   Comments pt drives   Lifestyle   Autonomy Per patient report, he lives alone in a [place] home with 3 KANE  At baseline, patient reports that he is independent in both ADLs and IADLs and is ambulatory with no AD  Reciprocal Relationships Limited local social support   Service to Others Retired- technician   Intrinsic Gratification None per pt   Psychosocial   Psychosocial (WDL) X   Patient Behaviors/Mood Flat affect; Cooperative   Ability to Express Feelings Able to express   Ability to Express Needs Able to express   Ability to Express Thoughts Able to express   Ability to Understand Others Understands   ADL   Eating Assistance 6  Modified independent   Grooming Assistance 6  Modified Independent   UB Bathing Assistance 5  Supervision/Setup   LB Bathing Assistance 5  Supervision/Setup   UB Dressing Assistance 5  Supervision/Setup    Afroditis [address]  5  Supervision/Setup   Functional Assistance 5  Supervision/Setup   Additional Comments ADL assist levels based on pt's functional performance during OT evaluation   Bed Mobility   Supine to Sit 5  Supervision   Additional items Assist x 1;HOB elevated; Increased time required   Sit to Supine 5  Supervision   Additional items Assist x 1;HOB elevated; Increased time required   Additional Comments Patient received lying supine in bed upon OT arrival; at end of session: pt returned lying supine in bed w/ all needs within reach   Transfers   Sit to Stand 5  Supervision   Additional items Assist x 1; Increased time required;Verbal cues   Stand to Sit 5  Supervision   Additional items Assist x 1; Increased time required;Verbal cues   Additional Comments Performed functional transfers initially using no AD, trialed SPC at end of session     Functional Mobility   Functional Mobility 5  Supervision   Additional Comments Fluctuation between supervision/CGA x1; ambulation within pt room and hallway   Additional items SPC  (SPC vs no AD)   Balance   Static Sitting Good   Dynamic Sitting Fair +   Static Standing Fair   Dynamic Standing Fair -   Ambulatory Fair -   Activity Tolerance   Activity Tolerance Patient tolerated treatment well   Medical Staff Made Aware PT Angie   Nurse Made Aware MEHRDAD Davis confirmed pt appropriate for therapy   RUE Assessment   RUE Assessment WFL  (AROM WFL; strength grossly 4/5 distally)   LUE Assessment   LUE Assessment WFL  (AROM WFL; strength grossly 4/5 distally)   Hand Function   Gross Motor Coordination Impaired   Fine Motor Coordination Functional   Sensation   Light Touch Partial deficits in the RUE;Partial deficits in the LUE  (Pt reports numbness in digits on b/l hands)   Vision-Basic Assessment   Current Vision Wears glasses for distance only Cognition   Overall Cognitive Status WFL   Arousal/Participation Alert; Cooperative   Attention Within functional limits   Orientation Level Oriented X4   Memory Within functional limits   Following Commands Follows all commands and directions without difficulty   Comments Patient agreeable to OT evaluation  Patient with noted delayed verbal responses, and requiring increased processing time  Administered SBT at time of IE, patient scored a 0 indicating normal cognition  Cognition Assessment Tools Other (Comment)  (Short Blessed Test)   Score 0   Assessment   Limitation Decreased ADL status; Decreased self-care trans;Decreased high-level ADLs; Decreased Safe judgement during ADL   Prognosis Good   Assessment Patient is a 59 y o  male seen for OT evaluation s/p admit to [address] [place] [address] on 9/23/2021 w/Ambulatory dysfunction  Commorbidities affecting patient's functional performance at time of assessment include: dizziness and essential HTN  Patient  has a past medical history of Hypertension  Orders placed for OT evaluation and treatment  Performed at least two patient identifiers during session including name and wristband  Prior to admission, patient was living alone in a one-story home with 3 KANE  At baseline, patient reports that he is independent in both ADLs and IADLs and is ambulatory with no AD  Personal factors affecting patient at time of initial evaluation include: limited caregiver support, difficulty performing IADLs and health management  Upon evaluation, patient requires supervision and set up assist for UB ADLs, supervision and set up assist for LB ADLs, transfers and functional ambulation in room and bathroom with supervision and contact guard assist, with the use of no AD vs SPC  Patient is alert and oriented x 4   Occupational performance is affected by the following deficits: flat affect, impaired gross motor coordination, dynamic sit/ stand balance deficit with poor standing tolerance time for self care and functional mobility and increased pain  Patient to benefit from continued Occupational Therapy treatment while in the hospital to address deficits as defined above and maximize level of functional independence with ADLs and functional mobility  Occupational Performance areas to address include: bathing/ shower, transfer to all surfaces, functional mobility, community mobility, emergency response, health maintenance, medication routine/ management, IADLS: Household maintenance, Leisure Participation and Social participation  From OT standpoint, recommendation at time of d/c would be home with increased social support  Goals   Patient Goals to return home   Plan   Treatment Interventions ADL retraining;Functional transfer training; Endurance training;Patient/family training; Compensatory technique education;Continued evaluation; Activityengagement   Goal Expiration Date 10/01/21   OT Treatment Day 0   OT Frequency 2-3x/wk   Recommendation   OT Discharge Recommendation No rehabilitation needs   Additional Comments  The patient's raw score on the AM-PAC Daily Activity inpatient short form is 20, standardized score is 42 03, greater than 39 4  Patients at this level are likely to benefit from discharge to home  Please refer to the recommendation of the Occupational Therapist for safe discharge planning     AM-PAC Daily Activity Inpatient   Lower Body Dressing 3   Bathing 3   Toileting 3   Upper Body Dressing 3   Grooming 4   Eating 4   Daily Activity Raw Score 20   Daily Activity Standardized Score (Calc for Raw Score >=11) 42 03   AM-PAC Applied Cognition Inpatient   Following a Speech/Presentation 4   Understanding Ordinary Conversation 4   Taking Medications 3   Remembering Where Things Are Placed or Put Away 3   Remembering List of 4-5 Errands 3   Taking Care of Complicated Tasks 3   Applied Cognition Raw Score 20   Applied Cognition Standardized Score 41 76   Barthel Index   Feeding 10   Bathing 0 Grooming Score 5   Dressing Score 10   Bladder Score 10   Bowels Score 10   Toilet Use Score 10   Transfers (Bed/Chair) Score 10   Mobility (Level Surface) Score 10   Stairs Score 5   Barthel Index Score 80   Modified [place] Scale   Modified [place] Scale 2     Occupational Therapy Goals to be completed in 5-7 Days:    1 - Patient will verbalize and demonstrate use of energy conservation/ deep breathing technique and work simplification skills during functional activity with no verbal cues  2 - Patient will verbalize and demonstrate good body mechanics and joint protection techniques during  ADLs/ IADLs with no verbal cues  3 - Patient will verbalize 3  safety awareness/ body mechanics principles to  prevent falls in the home setting  4 - Patient will increase standing tolerance time to 10 minutes with unilateral UE support to complete sink level ADLs@ mod I level  5 - Patient  will engage in activity configuration activity with good participation and mod I to increase time management skills and improve participation in a structured routine to improve overall quality of life  6 - Patient will transfer bed to Chair / toilet at Mod I assist level with AD as indicated  7 - Patient will complete UB and LB ADLs with Mod I assist      8 - Patient will demonstrate good safety awareness during functional transfers, mobility, and ADLs 100% of the time with no VCs  9 - Patient will complete IADL tasks at supervision/setup assist level  8 - Patient/ Family will demonstrate competency with UE Home Exercise Program       11- Pt will attend to continued cognitive assessment 100% of the time in order to provide most appropriate recommendations for d/c plans      Suyapa Bueno, OTR/L

## 2021-09-24 NOTE — ASSESSMENT & PLAN NOTE
Pt used to be on coumadin, no longer on coumadin  He is status post ablation     Continue home ASA and metoprolol

## 2021-09-24 NOTE — PHYSICAL THERAPY NOTE
Physical Therapy Evaluation   Time in: 755  Time out: 809  Total evaluation time: 14 minutes    Patient's Name: Neil Feldman    Admitting Diagnosis  Syncope [R55]    Problem List  Patient Active Problem List   Diagnosis    Essential hypertension    Hypercholesterolemia    Dizziness    Ambulatory dysfunction       Past Medical History  Past Medical History:   Diagnosis Date    Hypertension        Past Surgical History  Past Surgical History:   Procedure Laterality Date    GASTRIC BYPASS      LAMINECTOMY      L4-L5    WRIST GANGLION EXCISION Right        PT performed at least 2 patient identifiers during session: Name and wristband  09/24/21 0810   PT Last Visit   PT Visit Date 09/24/21   Note Type   Note type Evaluation   Pain Assessment   Pain Assessment Tool 0-10   Pain Score 5   Pain Location/Orientation Location: Generalized   Home Living   Type of [address] One level;Performs ADLs on one level; Other (Comment); Stairs to enter with rails  (3 KANE)   Bathroom Shower/Tub Tub/shower unit   Bathroom Toilet Standard   Bathroom Equipment Grab bars in shower   [address]   (no AD used at baseline)   Prior Function   Level of [place] Independent with ADLs and functional mobility   Lives With Alone   Receives Help From Family   ADL Assistance Independent   IADLs Independent   Falls in the last 6 months >10  ("every day")   Vocational Retired   Comments (+)    Restrictions/Precautions   [place] Bearing Precautions Per Order No   Braces or Orthoses Other (Comment)  (none per pt)   Other Precautions Telemetry; Fall Risk   General   Family/Caregiver Present No   Cognition   Overall Cognitive Status WFL  (defer to OT eval for comments)   Arousal/Participation Alert   Orientation Level Oriented X4   Memory   (defer to OT eval for comments)   Following Commands Follows all commands and directions without difficulty   Comments pt c delayed verbal responses; agreeable to PT eval   RUE Assessment   RUE Assessment   (defer to OT eval for comments)   LUE Assessment   LUE Assessment   (defer to OT eval for comments)   RLE Assessment   RLE Assessment WFL  (grossly 4/5)   LLE Assessment   LLE Assessment WFL  (grossly 4/5)   Coordination   Movements are Fluid and Coordinated 1   Sensation WFL   Light Touch   RLE Light Touch Grossly intact   LLE Light Touch Grossly intact   Bed Mobility   Supine to Sit 5  Supervision   Additional items Assist x 1;HOB elevated; Increased time required   Sit to Supine 5  Supervision   Additional items Assist x 1;HOB elevated; Increased time required   Transfers   Sit to Stand 5  Supervision   Additional items Assist x 1; Increased time required;Armrests   Stand to Sit 5  Supervision   Additional items Assist x 1; Increased time required;Armrests   Ambulation/Elevation   Gait pattern Decreased foot clearance; Inconsistent og; Step to  (veering towards L; lateral sway  no LOB)   Gait Assistance 5  Supervision   Additional items Assist x 1;Verbal cues   Assistive Device None   Distance 250'   Stair Management Assistance 5  Supervision   Additional items Assist x 1;Verbal cues   Stair Management Technique One rail R;Alternating pattern; Foreward   Number of Stairs 7   Balance   Static Sitting Good   Dynamic Sitting Fair +   Static Standing Fair   Dynamic Standing Narda Nascimento [id number] -   Endurance Deficit   Endurance Deficit No   Activity Tolerance   Activity Tolerance Patient tolerated treatment well   Medical Staff Made Aware care coordination with  [facility] verbalized pt appropriate to see, made aware of session outcome/recs   Assessment   Prognosis Good   Problem List Decreased strength; Impaired balance;Decreased mobility   Assessment Pt is 59 y o  male seen for moderate-complexity PT evaluation on 9/24/2021 s/p admit to [facility] on 9/23/2021 w/ Ambulatory dysfunction   PT was consulted to assess pt's functional mobility and d/c needs  Order placed for PT eval and tx, w/ up and OOB as tolerated order  PTA, pt resides alone in [facility] c 3 KANE; has had multiple falls "fall daily"; (+) ; retired  At time of eval, performing all phases of mobility at SBA level without overt LOB observed c level surface ambulation and stair trial  Upon evaluation, pt presenting with impaired functional mobility d/t decreased strength, impaired balance, decreased mobility  Pertinent PMHx and current co-morbidities affecting pt's physical performance at time of assessment include: dizziness, HTN, ambulatory dysfunction, ETOH abuse  Personal factors affecting pt at time of eval include: stairs to enter home, limited home support and positive fall history  The following objective measures performed on IE also reveal limitations: Barthel Index: 85/100, Modified [place]: 2 (slight disability) and AM-PAC 6-Clicks: 52/33  Pt's clinical presentation is currently evolving seen in pt's presentation of abnormal lab value(s), need for input for task focus and mobility technique and ongoing medical assessment  Overall, pt's rehab potential and prognosis to return to PLOF is good as impacted by objective findings, warranting pt to receive further skilled PT interventions to address identified impairments, activity limitation(s), and participation restriction(s)  Pt to benefit from continued PT tx to address deficits as defined above and maximize level of functional independent mobility and consistency in order for pt to return home, ambulate household & community distances c least restrictive AD  From PT/mobility standpoint, recommendation at time of d/c would be home with outpatient rehabilitation pending progress in order to facilitate return to PLOF     Barriers to Discharge Inaccessible home environment;Decreased caregiver support   Goals   Patient Goals to return home   STG Expiration Date 10/04/21   Short Term Goal #1 In 7-10 days: Increase bilateral LE strength 1/2 grade to facilitate independent mobility, Perform all bed mobility tasks independently to decrease caregiver burden, Perform all transfers independently to improve independence, Ambulate > 300 ft  with least restrictive assistive device modified independent w/o LOB and w/ normalized gait pattern 100% of the time, Navigate 3 stairs modified independent with unilateral handrail to facilitate return to previous living environment, Increase all balance 1/2 grade to decrease risk for falls, Complete exercise program independently and PT provider will perform functional balance assessment to determine fall risk   PT Treatment Day 1   Plan   Treatment/Interventions Functional transfer training;LE strengthening/ROM; Elevations; Therapeutic exercise;Patient/family training;Equipment eval/education; Bed mobility;Gait training;Spoke to nursing;OT  (higher level balance training)   PT Frequency   (3-5x/wk)   Recommendation   PT Discharge Recommendation Home with outpatient rehabilitation   Equipment Recommended Cane   PT - OK to Discharge Yes   Additional Comments The patient's AM-PAC Basic Mobility Inpatient Short Form Raw Score is 20, Standardized Score is 43 99  A standardized score greater than 42 9 suggests the patient may benefit from discharge to home  Please also refer to the recommendation of the Physical Therapist for safe discharge planning     AM-PAC Basic Mobility Inpatient   Turning in Bed Without Bedrails 4   Lying on Back to Sitting on Edge of Flat Bed 4   Moving Bed to Chair 3   Standing Up From Chair 3   Walk in Room 3   Climb 3-5 Stairs 3   Basic Mobility Inpatient Raw Score 20   Basic Mobility Standardized Score 43 99   Modified [place] Scale   Modified [place] Scale 2   Barthel Index   Feeding 10   Bathing 5   Grooming Score 5   Dressing Score 10   Bladder Score 10   Bowels Score 10   Toilet Use Score 10   Transfers (Bed/Chair) Score 10   Mobility (Level Surface) Score 10   Stairs Score 5   Barthel Index Score 85       Keri Vaz, PT, DPT    Physical Therapy Treatment Note  Time In: 810  Time Out: 818  Total Time: 8 min     S:  Pt agreeable to PT treatment session s/p PT eval, in no apparent distress and responsive      O:  Pt seen for PT treatment session this date with interventions consisting of gait training w/ emphasis on improving pt's ability to ambulate level surfaces x 100' with SBA provided by therapist with SPC  VC required for technique      A:  Pt able to demonstrate household distance gait trial with improved ambulatory balance by 1/2 grade c use of SPC vs no AD  Pt requiring moderate vc for appropriate sequencing/technique of SPC management  PT sizing SPC for pt's height; distributed SPC to pt from clean supply room  Pt with improved gait pattern/sequencing, reduced lateral sway and veering, improved step length and step thru pattern  NO uncorrected LOB observed c level surface gait training trial  Pt reporting he will probably not use SPC upon d/c; PT educating pt on enhanced balance and gait pattern improvement c use of SPC to assist c reduced risk of falls  Pt verbalizing understanding  Recommend continued encouragement for [facility] use in subsequent session(s)  Post session: pt returned BTB, all needs in reach and RN notified of session findings/recommendations      P:  Continue to recommend OP PT at time of d/c in order to maximize pt's functional independence and safety w/ mobility  Pt continues to be functioning below baseline level, and remains limited 2* factors listed above  PT will continue to see pt while here in order to address the deficits listed above and provide interventions consistent w/ POC in effort to achieve STGs      Keri Vaz, PT, DPT

## 2021-09-24 NOTE — SPEECH THERAPY NOTE
Rehab Team consulted  Records reviewed  Pt admitted c weakness, dizziness and falls  Pt passed RN Dysphagia Assessment  No reported communication deficits  Ct of head: No acute intracranial abnormality  (MRI of brain not ordered)  No additional inpatient Speech Pathology evaluation appears indicated at this time  Please re-consult if additional concerns arise  Thank you

## 2021-09-24 NOTE — ASSESSMENT & PLAN NOTE
This 68-year-old gentleman has an approximate 5 year history of having had atrial fibrillation being on Coumadin needing an ablation  He has since been maintained on metoprolol in addition to his antihypertensives after 1 of his recent monitoring devices a year so ago noted several episodes of SVT as well  The patient at this time, denies any symptoms of AFib such as eye fluttering in his chest palpitations of catching his breath or a sense of tachycardia  He reports he is compliant on his meds he is simply on aspirin and metoprolol at this time  His monitor has not demonstrated any AFib yet

## 2021-09-24 NOTE — ASSESSMENT & PLAN NOTE
This gentleman has been followed through the [facility] in [place] for control of his AFib and hypertension  He has remained on his losartan and amlodipine in a compliant fashion with what appeared to be good blood pressures with his office visits etcetera  His blood pressure at the time of presentation was controlled

## 2021-09-24 NOTE — H&P
[address] [facility] 0/[id number], 59 y o  male MRN: [id number]  Unit/Bed#: ED 14 Encounter: [id number]  Primary Care Provider: No primary care provider on file  Date and time admitted to hospital: 9/23/2021  7:14 PM    * Ambulatory dysfunction  Assessment & Plan  Patient with worsening generalized weakness and multiple recent falls  4 L had worsening gait over the past 1 year with acute worsening over the past few weeks  Possibly the setting of Wernickies  PT/OT    Dizziness  Assessment & Plan  Presented with dizziness which has been ongoing for the past 1 year with associated confusion  Possibly secondary to alcohol abuse  Consider neurology consult  Consider MRI brain  CT head showed no acute abnormalities    Essential hypertension  Assessment & Plan  Continue home amlodipine      VTE Prophylaxis: Heparin  / sequential compression device   Code Status: full code  POLST: There is no POLST form on file for this patient (pre-hospital)  Discussion with family: pt    Anticipated Length of Stay:  Patient will be admitted on an Observation basis with an anticipated length of stay of  < 2 midnights  Justification for Hospital Stay:  Ambulatory dysfunction    Total Time for Visit, including Counseling / Coordination of Care: 60 minutes  Greater than 50% of this total time spent on direct patient counseling and coordination of care  Chief Complaint:   Dizziness    History of Present Illness:    Reuben Oconnor is a 59 y o  male past medical history significant alcohol abuse initially presented with dizziness and multiple falls  Reports has been having dizziness for the past 1 year with acute worsening recently  Also reports multiple recent falls which have been going on for last year but worsening recently  Denies any other acute complaints    Denies shortness of breath, chest pain, palpitations fevers, chills abdominal pain nausea, vomiting, diarrhea, constipation, dysuria, headaches    Review of Systems:    Review of Systems   Constitutional: Negative for appetite change, chills, diaphoresis, fatigue, fever and unexpected weight change  HENT: Negative for congestion, rhinorrhea and sore throat  Eyes: Negative for photophobia and visual disturbance  Respiratory: Negative for cough, shortness of breath and wheezing  Cardiovascular: Negative for chest pain, palpitations and leg swelling  Gastrointestinal: Negative for abdominal pain, anal bleeding, blood in stool, constipation, diarrhea, nausea and vomiting  Genitourinary: Negative for decreased urine volume, difficulty urinating, dysuria, flank pain, frequency, hematuria and urgency  Musculoskeletal: Negative for arthralgias, back pain, joint swelling and myalgias  Skin: Negative for color change and rash  Neurological: Positive for dizziness and weakness  Negative for seizures, facial asymmetry, speech difficulty, numbness and headaches  Psychiatric/Behavioral: Negative for agitation, confusion and decreased concentration  The patient is not nervous/anxious  Past Medical and Surgical History:     Past Medical History:   Diagnosis Date    Hypertension        Past Surgical History:   Procedure Laterality Date    GASTRIC BYPASS      LAMINECTOMY      L4-L5    WRIST GANGLION EXCISION Right        Meds/Allergies:    Prior to Admission medications    Medication Sig Start Date End Date Taking? Authorizing Provider   amLODIPine (NORVASC) 5 mg tablet Take 5 mg by mouth daily    Historical Provider, MD   ketorolac (TORADOL) 10 mg tablet Take 1 tablet (10 mg total) by mouth every 6 (six) hours as needed for moderate pain for up to 12 doses 11/22/18   Mago Yoder MD   tamsulosin [facility]) 0 4 mg Take 1 capsule (0 4 mg total) by mouth daily with dinner for 10 days 11/22/18 12/2/18  Mago Yoder MD     I have reviewed home medications with patient personally      Allergies: No Known Allergies    Social History: Marital Status: /Civil Union     Patient Pre-hospital Living Situation: home  Patient Pre-hospital Level of Mobility: independent  Patient Pre-hospital Diet Restrictions: none  Substance Use History:   Social History     Substance and Sexual Activity   Alcohol Use Yes    Comment: rarely     Social History     Tobacco Use   Smoking Status Never Smoker   Smokeless Tobacco Never Used     Social History     Substance and Sexual Activity   Drug Use No       Family History:    History reviewed  No pertinent family history  Physical Exam:     Vitals:   Blood Pressure: 141/71 (09/23/21 2130)  Pulse: 65 (09/23/21 2130)  Temperature: 98 °F (36 7 °C) (09/23/21 1404)  Temp Source: Oral (09/23/21 1404)  Respirations: 18 (09/23/21 2130)  Weight - Scale: 79 7 kg (175 lb 11 3 oz) (09/23/21 1404)  SpO2: 100 % (09/23/21 2130)    Physical Exam  Constitutional:       General: He is not in acute distress  Appearance: He is well-developed  He is not diaphoretic  HENT:      Head: Normocephalic and atraumatic  Nose: Nose normal       Mouth/Throat:      Pharynx: No oropharyngeal exudate  Eyes:      General: No scleral icterus  Conjunctiva/sclera: Conjunctivae normal    Cardiovascular:      Rate and Rhythm: Normal rate and regular rhythm  Heart sounds: Normal heart sounds  No murmur heard  No friction rub  No gallop  Pulmonary:      Effort: Pulmonary effort is normal  No respiratory distress  Breath sounds: Normal breath sounds  No wheezing or rales  Chest:      Chest wall: No tenderness  Abdominal:      General: Bowel sounds are normal  There is no distension  Palpations: Abdomen is soft  Tenderness: There is no abdominal tenderness  There is no guarding  Musculoskeletal:         General: No tenderness or deformity  Normal range of motion  Cervical back: Normal range of motion and neck supple  Skin:     General: Skin is warm and dry  Findings: No erythema  Neurological:      Mental Status: He is alert  Mental status is at baseline  Additional Data:     Lab Results: I have personally reviewed pertinent reports  Results from last 7 days   Lab Units 09/23/21  1411   WBC Thousand/uL 4 82   HEMOGLOBIN g/dL 12 8   HEMATOCRIT % 38 0   PLATELETS Thousands/uL 135*   NEUTROS PCT % 73   LYMPHS PCT % 14   MONOS PCT % 11   EOS PCT % 2     Results from last 7 days   Lab Units 09/23/21  1411   SODIUM mmol/L 139   POTASSIUM mmol/L 3 6   CHLORIDE mmol/L 105   CO2 mmol/L 26   BUN mg/dL 10   CREATININE mg/dL 0 82   ANION GAP mmol/L 8   CALCIUM mg/dL 8 4   ALBUMIN g/dL 3 4*   TOTAL BILIRUBIN mg/dL 0 43   ALK PHOS U/L 63   ALT U/L 17   AST U/L 14   GLUCOSE RANDOM mg/dL 97                       Imaging: I have personally reviewed pertinent reports  CT head without contrast   Final Result by Eliseo Lezama DO (09/23 2040)      No acute intracranial abnormality  Sinus disease                  Workstation performed: [id number]         XR chest 1 view portable   Final Result by Kerry Welsh MD (09/23 1822)      No acute cardiopulmonary disease  Workstation performed: [id number]             EKG, Pathology, and Other Studies Reviewed on Admission:   · EKG: revewed    Allscripts / Epic Records Reviewed: Yes     ** Please Note: This note has been constructed using a voice recognition system   **

## 2021-09-24 NOTE — ASSESSMENT & PLAN NOTE
This patient's reports of dizziness over the last year reportedly occur her when he gets up to ambulate  He does not endorse that he has any complaints of dizziness when he is stable or sitting down  He has had orthostatics done by both myself as well as by the staff and on either of the sets of orthostatics as the patient positive  His blood pressures have been normal   I gaited him extensively here in the ED with sharp turns and a reverse gait any only had 1 episode of a sidestep on a sharper turn  He does not endorse vertiginous or disequilibrius symptoms at this point  He has been seen and cleared by PT and cautioned to use a single-point cane as an assistive device 1 who has an episode of dizziness  I found him to be somewhat distracted by carrying the cane, rather than be focused on his ambulation    He would benefit from outpatient PT

## 2021-09-24 NOTE — TELEMEDICINE
TeleConsultation - [address] 59 y o  male MRN: [id number]  Unit/Bed#: -01 Encounter: [id number]        REQUIRED DOCUMENTATION:     1  This service was provided via Telemedicine  2  Provider located at [place]  3  TeleMed provider: Annie Garces  4  Identify all parties in room with patient during tele consult:  Patient  5  Patient was then informed that this was a Telemedicine visit and that the exam was being conducted confidentially over secure lines  My office door was closed  No one else was in the room  Patient acknowledged consent and understanding of privacy and security of the Telemedicine visit, and gave us permission to have the assistant stay in the room in order to assist with the history and to conduct the exam   I informed the patient that I have reviewed their record in Epic and presented the opportunity for them to ask any questions regarding the visit today  The patient agreed to participate  Assessment/Plan     Assessment:  Alcohol use disorder; 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;    Plan:   Risks, benefits and possible side effects of Medications:   Risks, benefits, and possible side effects of medications explained to patient and patient verbalizes understanding  Recommend starting Cymbalta 30 milligrams p o  twice daily for treatment of depression  As per Neurology this will likely also be helpful for his neuropathic pain  After the 1st week this dosing can be consolidated to 60 milligrams daily  Recommend Remeron 7 5 milligrams q h s  for insomnia and his appetite stimulant  Recommend outpatient dual diagnosis psychiatric follow-up treatment  Starting the patient out with partial hospitalization or intensive outpatient treatment would be most ideal if available through the patient's insurance is in his geographic area      Chief Complaint:  Depression    History of Present Illness     Reason for Consult / Principal Problem:  Depression and alcohol use    Patient is a 59 y o  male who some bone to the emergency department after we did a welfare check on the patient found in very poor condition suffering multiple falls  The patient reports he has been feeling very depressed over recent months  He states he is going through a divorce currently  Over the past year he states he has been drinking whiskey with a six-pack pack of beer daily up until 2 weeks ago when he claims he reduce his alcohol intake to 2 beers per day  He reports decreased appetite and decreased sleep  And Kodi Flow and apathy have been present  Past psychiatric history:  The patient denies any prior psychiatric treatment or rehabilitation  Social history:  Patient is alone  The patient reports he is going through a divorce currently  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 been off of work for past 6 months family for his disability  Mental status examination:  The patient is alert and oriented in all spheres  Affect is depressed and flat  He states the near monotone voice  Sensorium is clear  He appears to be of average intellectual functioning  He describes his memory as so-so  Thought process is logical linear  Thought content is reality based  He denies suicidal homicidal ideation  He denies any hallucinations and other psychotic features  Insight and judgment are intact  He expresses motivation for outpatient treatment      Inpatient consult to Psychiatry  Consult performed by: Yesica Garzon  Consult ordered by: Monica Reynolds DO            Past Medical History:   Diagnosis Date    Hypertension        Medical Review Of Systems:  Review of Systems    Meds/Allergies   all current active meds have been reviewed  No Known Allergies    Objective   Vital signs in last 24 hours:  Temp:  [98 4 °F (36 9 °C)] 98 4 °F (36 9 °C)  HR:  [51-77] 63  Resp:  [1-28] 18  BP: (105-158)/() 120/77      Intake/Output Summary (Last 24 hours) at 9/24/2021 1922  Last data filed at 9/24/2021 1828  Gross per 24 hour   Intake 280 ml   Output --   Net 280 ml         Lab Results:  Reviewed  Imaging Studies:  Reviewed  EKG, Pathology, and Other Studies:  Reviewed    Code Status: Level 1 - Full Code  Advance Directive and Living Will:      Power of :    POLST:      Counseling / Coordination of Care  Total floor / unit time spent today 30 minutes  Greater than 50% of total time was spent with the patient and / or family counseling and / or coordination of care  A description of the counseling / coordination of care:  Chart review, patient evaluation, coordination and communication with staff and provider

## 2021-09-24 NOTE — ASSESSMENT & PLAN NOTE
Pt admits to being depressed and has been depressed for a long time  Denies any SI or HI  Pt states he would like to be seen by psych  Consult placed, appreciate recommendations

## 2021-09-24 NOTE — ASSESSMENT & PLAN NOTE
· Pt admits to being depressed and has been depressed for a long time  · Denies any SI or HI  · Seen by psych and neuro - recommending cymbalta 30 mg bid x1 week then 60 mg daily  · Also recommend remeron 7 5 mg qhs for insomnia and appetite stimulant  · Recommend outpatient dual diagnosis psychiatric follow up treatment  Starting the patient out with partial hospitalization or intensive outpatient treatment would be most ideal if available

## 2021-09-24 NOTE — ASSESSMENT & PLAN NOTE
Presented with dizziness which has been ongoing for the past 1 year with associated confusion  Possibly secondary to alcohol abuse  Consider neurology consult  Consider MRI brain  CT head showed no acute abnormalities

## 2021-09-24 NOTE — ASSESSMENT & PLAN NOTE
Presented with dizziness which has been ongoing for the past 1 year with associated confusion  Possibly secondary to alcohol abuse  Neuro consulted appreciate recommendations  MRI brain from a year ago was normal when patient had similar sx   Will not repeat  CT head showed no acute abnormalities  CTA head/neck negative for occlusions  Orthostatic vitals were negative

## 2021-09-24 NOTE — ASSESSMENT & PLAN NOTE
Patient with worsening generalized weakness and multiple recent falls   Per son has had worsening gait over the past 1 year with acute worsening over the past few weeks  Possibly the setting of Wernickies  PT/OT to evaluate for placement  Case management consulted  Will check vit M90 and folic acid as patient has long standing alcohol history  Started on Vit P16 and folic acid supplementation  Ambulatory dysfunction could be from deconditioning from being inactive and spending most of the day sitting around    [facility]

## 2021-09-24 NOTE — ASSESSMENT & PLAN NOTE
Patient with worsening generalized weakness and multiple recent falls    4 L had worsening gait over the past 1 year with acute worsening over the past few weeks  Possibly the setting of Wernickies  PT/OT

## 2021-09-25 VITALS
DIASTOLIC BLOOD PRESSURE: 67 MMHG | SYSTOLIC BLOOD PRESSURE: 114 MMHG | BODY MASS INDEX: 25.43 KG/M2 | RESPIRATION RATE: 17 BRPM | HEART RATE: 67 BPM | HEIGHT: 67 IN | TEMPERATURE: 98.8 F | OXYGEN SATURATION: 96 % | WEIGHT: 162.04 LBS

## 2021-09-25 LAB
ALBUMIN SERPL BCP-MCNC: 3.6 G/DL (ref 3.5–5)
ANION GAP SERPL CALCULATED.3IONS-SCNC: 7 MMOL/L (ref 4–13)
ATRIAL RATE: 63 BPM
BUN SERPL-MCNC: 11 MG/DL (ref 5–25)
CALCIUM SERPL-MCNC: 9 MG/DL (ref 8.3–10.1)
CHLORIDE SERPL-SCNC: 105 MMOL/L (ref 100–108)
CO2 SERPL-SCNC: 27 MMOL/L (ref 21–32)
CREAT SERPL-MCNC: 0.85 MG/DL (ref 0.6–1.3)
ERYTHROCYTE [DISTWIDTH] IN BLOOD BY AUTOMATED COUNT: 12.2 % (ref 11.6–15.1)
FOLATE SERPL-MCNC: 16.8 NG/ML (ref 3.1–17.5)
GFR SERPL CREATININE-BSD FRML MDRD: 93 ML/MIN/1.73SQ M
GLUCOSE P FAST SERPL-MCNC: 97 MG/DL (ref 65–99)
GLUCOSE SERPL-MCNC: 97 MG/DL (ref 65–140)
HCT VFR BLD AUTO: 41.4 % (ref 36.5–49.3)
HGB BLD-MCNC: 14.2 G/DL (ref 12–17)
MAGNESIUM SERPL-MCNC: 2.1 MG/DL (ref 1.6–2.6)
MCH RBC QN AUTO: 31.2 PG (ref 26.8–34.3)
MCHC RBC AUTO-ENTMCNC: 34.3 G/DL (ref 31.4–37.4)
MCV RBC AUTO: 91 FL (ref 82–98)
P AXIS: 175 DEGREES
PHOSPHATE SERPL-MCNC: 3.5 MG/DL (ref 2.3–4.1)
PLATELET # BLD AUTO: 141 THOUSANDS/UL (ref 149–390)
PMV BLD AUTO: 11.1 FL (ref 8.9–12.7)
POTASSIUM SERPL-SCNC: 4.2 MMOL/L (ref 3.5–5.3)
PR INTERVAL: 130 MS
QRS AXIS: 81 DEGREES
QRSD INTERVAL: 80 MS
QT INTERVAL: 444 MS
QTC INTERVAL: 454 MS
RBC # BLD AUTO: 4.55 MILLION/UL (ref 3.88–5.62)
SODIUM SERPL-SCNC: 139 MMOL/L (ref 136–145)
T WAVE AXIS: 62 DEGREES
TSH SERPL DL<=0.05 MIU/L-ACNC: 0.79 UIU/ML (ref 0.36–3.74)
VENTRICULAR RATE: 63 BPM
VIT B12 SERPL-MCNC: 174 PG/ML (ref 100–900)
WBC # BLD AUTO: 4.34 THOUSAND/UL (ref 4.31–10.16)

## 2021-09-25 PROCEDURE — 83735 ASSAY OF MAGNESIUM: CPT | Performed by: STUDENT IN AN ORGANIZED HEALTH CARE EDUCATION/TRAINING PROGRAM

## 2021-09-25 PROCEDURE — 94760 N-INVAS EAR/PLS OXIMETRY 1: CPT

## 2021-09-25 PROCEDURE — 82746 ASSAY OF FOLIC ACID SERUM: CPT | Performed by: NURSE PRACTITIONER

## 2021-09-25 PROCEDURE — 82607 VITAMIN B-12: CPT | Performed by: NURSE PRACTITIONER

## 2021-09-25 PROCEDURE — 84443 ASSAY THYROID STIM HORMONE: CPT | Performed by: NURSE PRACTITIONER

## 2021-09-25 PROCEDURE — 85027 COMPLETE CBC AUTOMATED: CPT | Performed by: STUDENT IN AN ORGANIZED HEALTH CARE EDUCATION/TRAINING PROGRAM

## 2021-09-25 PROCEDURE — 80069 RENAL FUNCTION PANEL: CPT | Performed by: STUDENT IN AN ORGANIZED HEALTH CARE EDUCATION/TRAINING PROGRAM

## 2021-09-25 PROCEDURE — 99217 PR OBSERVATION CARE DISCHARGE MANAGEMENT: CPT | Performed by: PHYSICIAN ASSISTANT

## 2021-09-25 PROCEDURE — 93010 ELECTROCARDIOGRAM REPORT: CPT | Performed by: INTERNAL MEDICINE

## 2021-09-25 PROCEDURE — 82652 VIT D 1 25-DIHYDROXY: CPT | Performed by: NURSE PRACTITIONER

## 2021-09-25 PROCEDURE — 94762 N-INVAS EAR/PLS OXIMTRY CONT: CPT

## 2021-09-25 RX ORDER — FOLIC ACID 1 MG/1
1 TABLET ORAL DAILY
Qty: 30 TABLET | Refills: 0 | Status: SHIPPED | OUTPATIENT
Start: 2021-09-25 | End: 2021-10-25

## 2021-09-25 RX ORDER — LANOLIN ALCOHOL/MO/W.PET/CERES
100 CREAM (GRAM) TOPICAL DAILY
Refills: 0
Start: 2021-09-26 | End: 2021-10-26

## 2021-09-25 RX ORDER — DULOXETIN HYDROCHLORIDE 30 MG/1
30 CAPSULE, DELAYED RELEASE ORAL 2 TIMES DAILY
Qty: 60 CAPSULE | Refills: 1 | Status: SHIPPED | OUTPATIENT
Start: 2021-09-25 | End: 2021-09-25 | Stop reason: HOSPADM

## 2021-09-25 RX ORDER — MIRTAZAPINE 7.5 MG/1
7.5 TABLET, FILM COATED ORAL
Qty: 30 TABLET | Refills: 1 | Status: SHIPPED | OUTPATIENT
Start: 2021-09-25 | End: 2021-09-25 | Stop reason: HOSPADM

## 2021-09-25 RX ORDER — LANOLIN ALCOHOL/MO/W.PET/CERES
100 CREAM (GRAM) TOPICAL DAILY
Refills: 0
Start: 2021-09-26 | End: 2021-09-25

## 2021-09-25 RX ORDER — FOLIC ACID 1 MG/1
1 TABLET ORAL DAILY
Refills: 0
Start: 2021-09-26 | End: 2021-09-25 | Stop reason: HOSPADM

## 2021-09-25 RX ORDER — MIRTAZAPINE 7.5 MG/1
7.5 TABLET, FILM COATED ORAL
Qty: 30 TABLET | Refills: 0 | Status: SHIPPED | OUTPATIENT
Start: 2021-09-25 | End: 2021-10-25

## 2021-09-25 RX ORDER — DULOXETIN HYDROCHLORIDE 30 MG/1
30 CAPSULE, DELAYED RELEASE ORAL 2 TIMES DAILY
Qty: 60 CAPSULE | Refills: 0 | Status: SHIPPED | OUTPATIENT
Start: 2021-09-25 | End: 2021-10-25

## 2021-09-25 RX ORDER — TAMSULOSIN HYDROCHLORIDE 0.4 MG/1
0.4 CAPSULE ORAL
Qty: 30 CAPSULE | Refills: 1 | Status: SHIPPED | OUTPATIENT
Start: 2021-09-25 | End: 2021-10-05

## 2021-09-25 RX ADMIN — AMLODIPINE BESYLATE 5 MG: 5 TABLET ORAL at 09:39

## 2021-09-25 RX ADMIN — DULOXETINE HYDROCHLORIDE 30 MG: 30 CAPSULE, DELAYED RELEASE ORAL at 17:26

## 2021-09-25 RX ADMIN — FOLIC ACID 1 MG: 1 TABLET ORAL at 09:39

## 2021-09-25 RX ADMIN — DULOXETINE HYDROCHLORIDE 30 MG: 30 CAPSULE, DELAYED RELEASE ORAL at 09:39

## 2021-09-25 RX ADMIN — HEPARIN SODIUM 5000 UNITS: 5000 INJECTION INTRAVENOUS; SUBCUTANEOUS at 07:21

## 2021-09-25 RX ADMIN — THIAMINE HCL TAB 100 MG 100 MG: 100 TAB at 09:39

## 2021-09-25 RX ADMIN — MULTIPLE VITAMINS W/ MINERALS TAB 1 TABLET: TAB ORAL at 09:39

## 2021-09-25 RX ADMIN — HEPARIN SODIUM 5000 UNITS: 5000 INJECTION INTRAVENOUS; SUBCUTANEOUS at 15:30

## 2021-09-25 RX ADMIN — TAMSULOSIN HYDROCHLORIDE 0.4 MG: 0.4 CAPSULE ORAL at 15:38

## 2021-09-25 NOTE — CASE MANAGEMENT
Case Management Assessment & Discharge Planning Note    Patient name Bolivar Amador  Location /-01 MRN [id number]  : 1960 Date 2021       Current Admission Date: 2021  Current Admission Diagnosis:  Ambulatory dysfunction  Previous Admission - Discharge Date:18   LOS (days): 0  Geometric Mean LOS (GMLOS) (days):   Days to GMLOS: Previous Discharge Diagnosis:  There are no discharge diagnoses documented for the most recent discharge  OBJECTIVE:        Bundle(if applicable): Bundled Patient Payment:  (NO)  Current admission status: Observation       Preferred Pharmacy:   PATIENT/FAMILY REPORTS NO PREFERRED PHARMACY  No address on file      [facility] [place] [address], [address] [place] [id number]  Phone: [phone] Fax: [phone]    Primary Care Provider: No primary care provider on file  Primary Insurance: BLUE CROSS  Secondary Insurance:     ASSESSMENT:  Active Health Care Agents     [facility] Phone: [phone] (Mobile)               Advance Directives  Does patient have a [address]?: No  Was patient offered paperwork?: Yes (declined)  Does patient currently have a Health Care decision maker?: Yes, please see Health Care Proxy section  Does patient have Advance Directives?: No  Was patient offered paperwork?: Yes (declined)    Obs Notice Signed: 21         Readmission Root Cause  30 Day Readmission: No    Patient Information  Admitted from[de-identified] Home  Mental Status: Alert  During Assessment patient was accompanied by: Not accompanied during assessment  Assessment information provided by[de-identified] Patient  Primary Caregiver: Self  Support Systems: [facility] of Residence: [address] do you live in?: [address] [facility] entry access options   Select all that apply : Stairs  Number of steps to enter home : 4  Do the steps have railings?: No  Type of Current Residence: [facility]  Living Arrangements: Lives Alone  Is patient a ?: No    Activities of Daily Living Prior to Admission  Functional Status: Independent  Completes ADLs independently?: Yes  Ambulates independently?: Yes  Does patient use assisted devices?: No  Does patient currently own DME?: No  Does patient have a history of Outpatient Therapy (PT/OT)?: No  Does the patient have a history of Short-Term Rehab?: No  Does patient have a history of HHC?: No  Does patient currently have [facility]?: No         Patient Information Continued  Income Source: Pension/shelter  Does patient have prescription coverage?: Yes  Does patient receive dialysis treatments?: No  Does patient have a history of substance abuse?: No  Does patient have a history of Mental Health Diagnosis?: Yes  Is patient receiving treatment for mental health?: Yes (evaluated by Psych, OP providers list given to patient at discharge)  Has patient received inpatient treatment related to mental health in the last 2 years?: No         Means of Transportation  Means of Transport to Appts[de-identified] [address]  In the past 12 months, has lack of transportation kept you from medical appointments or from getting medications?: No  In the past 12 months, has lack of transportation kept you from meetings, work, or from getting things needed for daily living?: No  Was application for public transport provided?: No    DISCHARGE DETAILS:    Discharge planning discussed with[de-identified] patient at [facility]  Freedom of Choice: Yes  Comments - Freedom of Choice: patient choice considered in DC plan    Contacts  Patient Contacts: Ash Kirkland  Relationship to Patient[de-identified] Family  Contact Method: Phone  Phone Number: [phone]  Reason/Outcome: Emergency [address]         Is the patient interested in [facility] at discharge?: No    DME Referral Provided  Referral made for DME?: No (cane given to patient by PT)    Other Referral/Resources/Interventions Provided:  Interventions: Outpatient PT, Other (Specify) ([facility] [place], Lyft ride)  Referral Comments: ziaft reggie anaya signed, [address] resource sheet shared         Discharge Destination Plan[de-identified] Home     Type of Transport: [address] with OP therapy, [address] resources and Lyft ride

## 2021-09-25 NOTE — ASSESSMENT & PLAN NOTE
· Presented with dizziness which has been ongoing for the past 1 year with associated confusion  · Possibly secondary to alcohol abuse  · Neuro consulted, appreciate recommendations  · MRI brain from a year ago was normal when patient had similar sx   Will not repeat  · CT head showed no acute abnormalities  · CTA head/neck negative for occlusions  · Orthostatic vitals were negative

## 2021-09-25 NOTE — ASSESSMENT & PLAN NOTE
· Patient with worsening generalized weakness and multiple recent falls   Per son has had worsening gait over the past 1 year with acute worsening over the past few weeks  · PT/OT to evaluated patient and recommend outpatient therapy  · Started on Vit J36 and folic acid supplementation  · Ambulatory dysfunction could be from deconditioning from being inactive and spending most of the day sitting around    [facility]

## 2021-09-25 NOTE — DISCHARGE SUMMARY
[facility] Discharge- Reuben Oconnor [birth year], 59 y o  male MRN: [id number]  Unit/Bed#: -[facility] Encounter: [id number]  Primary Care Provider: No primary care provider on file  Date and time admitted to hospital: 9/23/2021  7:14 PM    * Ambulatory dysfunction  Assessment & Plan  · Patient with worsening generalized weakness and multiple recent falls  Per son has had worsening gait over the past 1 year with acute worsening over the past few weeks  · PT/OT to evaluated patient and recommend outpatient therapy  · Started on Vit O70 and folic acid supplementation  · Ambulatory dysfunction could be from deconditioning from being inactive and spending most of the day sitting around          Depression  Assessment & Plan  · Pt admits to being depressed and has been depressed for a long time  · Denies any SI or HI  · Seen by psych and neuro - recommending cymbalta 30 mg bid x1 week then 60 mg daily  · Also recommend remeron 7 5 mg qhs for insomnia and appetite stimulant  · Recommend outpatient dual diagnosis psychiatric follow up treatment  Starting the patient out with partial hospitalization or intensive outpatient treatment would be most ideal if available  PAF (paroxysmal atrial fibrillation) (HCC)  Assessment & Plan  · Pt used to be on coumadin, no longer on coumadin  He is status post ablation  · Continue home ASA and metoprolol      Dizziness  Assessment & Plan  · Presented with dizziness which has been ongoing for the past 1 year with associated confusion  · Possibly secondary to alcohol abuse  · Neuro consulted, appreciate recommendations  · MRI brain from a year ago was normal when patient had similar sx   Will not repeat  · CT head showed no acute abnormalities  · CTA head/neck negative for occlusions  · Orthostatic vitals were negative    Essential hypertension  Assessment & Plan  · Continue home amlodipine        Medical Problems     Resolved Problems  Date Reviewed: 9/25/2021    None              Discharging Physician / Practitioner: Juanita Helm PA-C  PCP: No primary care provider on file  Admission Date:   Admission Orders (From admission, onward)     Ordered        09/23/21 2131  Place in Observation  Once                   Discharge Date: 09/25/21    Consultations During Hospital Stay:  [facility]  · IP CONSULT TO PSYCHIATRY     Procedures Performed:   · None     Significant Findings / Test Results:   CTA head and neck w wo contrast   Final Result by Mira Chiang DO (09/24 1449)      CT brain: No acute intracranial abnormality  CT angiography: Unremarkable cervical and intracranial vasculature  CT head without contrast   Final Result by Anuj Cole DO (09/23 2040)      No acute intracranial abnormality  Sinus disease      XR chest 1 view portable   Final Result by Susana Garcia MD (09/23 1822)      No acute cardiopulmonary disease  Incidental Findings:   · None      Test Results Pending at Discharge (will require follow up): · None      Outpatient Tests Requested:  · Neurology  · PCP  · Psych outpatient  · PT/OT outpatient     Complications:  None     Reason for Admission: ambulatory dysfunction     Hospital Course:   Cande Driscoll is a 59 y o  male patient who originally presented to the hospital on 9/23/2021 due to dizziness and falls  Symptoms ongoing for 1 year  Patient also expressing depressive symptoms and neuropathic pain  Seen by Neurology as well as Psychiatry team   Evaluated by Physical therapy and Occupational therapy teams  Currently he is hemodynamically stable for outpatient treatment  Cymbalta and Remeron initiated as above  Complete alcohol cessation advised  Outpatient dual diagnosis treatment recommended  Please see above list of diagnoses and related plan for additional information       Condition at Discharge: stable    Discharge Day Visit / Exam:   Subjective:  Patient seen this morning, expresses desire to go home  He was seen by Psychiatry yesterday and is agreeable to Cymbalta and Remeron  He was advised that he is recommended for outpatient therapy and is agreeable  He does have to use [employer] and [employer] services to get around, he does not drive  Vitals: Blood Pressure: 125/79 (09/25/21 0730)  Pulse: 59 (09/25/21 0730)  Temperature: 98 2 °F (36 8 °C) (09/25/21 0730)  Temp Source: Oral (09/24/21 1719)  Respirations: 12 (09/25/21 0730)  Height: 5' 7" (170 2 cm) (09/24/21 1719)  Weight - Scale: 73 5 kg (162 lb 0 6 oz) (09/24/21 1719)  SpO2: 97 % (09/25/21 0811)  Exam:   Physical Exam  Vitals and nursing note reviewed  Constitutional:       General: He is not in acute distress  Appearance: He is normal weight  He is not toxic-appearing or diaphoretic  Cardiovascular:      Rate and Rhythm: Normal rate and regular rhythm  Pulmonary:      Effort: Pulmonary effort is normal  No respiratory distress  Breath sounds: Normal breath sounds  Abdominal:      General: Bowel sounds are normal  There is no distension  Palpations: Abdomen is soft  Neurological:      Mental Status: He is alert and oriented to person, place, and time  Cranial Nerves: No cranial nerve deficit  Discussion with Family: Patient declined call to   Discharge instructions/Information to patient and family:   See after visit summary for information provided to patient and family  Provisions for Follow-Up Care:  See after visit summary for information related to follow-up care and any pertinent home health orders  Disposition:   Home    Planned Readmission: none      Discharge Statement:  I spent 25 minutes discharging the patient  This time was spent on the day of discharge  I had direct contact with the patient on the day of discharge   Greater than 50% of the total time was spent examining patient, answering all patient questions, arranging and discussing plan of care with patient as well as directly providing post-discharge instructions  Additional time then spent on discharge activities  Discharge Medications:  See after visit summary for reconciled discharge medications provided to patient and/or family        **Please Note: This note may have been constructed using a voice recognition system**

## 2021-09-25 NOTE — ASSESSMENT & PLAN NOTE
· Pt used to be on coumadin, no longer on coumadin  He is status post ablation     · Continue home ASA and metoprolol

## 2021-09-27 LAB — 1,25(OH)2D3 SERPL-MCNC: 28.4 PG/ML (ref 19.9–79.3)
